# Patient Record
Sex: FEMALE | Race: WHITE | NOT HISPANIC OR LATINO | URBAN - METROPOLITAN AREA
[De-identification: names, ages, dates, MRNs, and addresses within clinical notes are randomized per-mention and may not be internally consistent; named-entity substitution may affect disease eponyms.]

---

## 2018-05-04 ENCOUNTER — RECORDS - HEALTHEAST (OUTPATIENT)
Dept: LAB | Facility: CLINIC | Age: 73
End: 2018-05-04

## 2018-05-04 LAB
25(OH)D3 SERPL-MCNC: 41.4 NG/ML (ref 30–80)
ALBUMIN SERPL-MCNC: 2.9 G/DL (ref 3.5–5)
ALP SERPL-CCNC: 61 U/L (ref 45–120)
ALT SERPL W P-5'-P-CCNC: <9 U/L (ref 0–45)
ANION GAP SERPL CALCULATED.3IONS-SCNC: 8 MMOL/L (ref 5–18)
AST SERPL W P-5'-P-CCNC: 11 U/L (ref 0–40)
BILIRUB SERPL-MCNC: 0.8 MG/DL (ref 0–1)
BUN SERPL-MCNC: 17 MG/DL (ref 8–28)
CALCIUM SERPL-MCNC: 9.2 MG/DL (ref 8.5–10.5)
CHLORIDE BLD-SCNC: 101 MMOL/L (ref 98–107)
CO2 SERPL-SCNC: 31 MMOL/L (ref 22–31)
CREAT SERPL-MCNC: 0.76 MG/DL (ref 0.6–1.1)
ERYTHROCYTE [DISTWIDTH] IN BLOOD BY AUTOMATED COUNT: 13.2 % (ref 11–14.5)
GFR SERPL CREATININE-BSD FRML MDRD: >60 ML/MIN/1.73M2
GLUCOSE BLD-MCNC: 86 MG/DL (ref 70–125)
HBA1C MFR BLD: 5.9 % (ref 4.2–6.1)
HCT VFR BLD AUTO: 37.8 % (ref 35–47)
HGB BLD-MCNC: 11.7 G/DL (ref 12–16)
MCH RBC QN AUTO: 28.8 PG (ref 27–34)
MCHC RBC AUTO-ENTMCNC: 31 G/DL (ref 32–36)
MCV RBC AUTO: 93 FL (ref 80–100)
PLATELET # BLD AUTO: 227 THOU/UL (ref 140–440)
PMV BLD AUTO: 9.4 FL (ref 8.5–12.5)
POTASSIUM BLD-SCNC: 3.7 MMOL/L (ref 3.5–5)
PROT SERPL-MCNC: 6.2 G/DL (ref 6–8)
RBC # BLD AUTO: 4.06 MILL/UL (ref 3.8–5.4)
SODIUM SERPL-SCNC: 140 MMOL/L (ref 136–145)
TSH SERPL DL<=0.005 MIU/L-ACNC: 2.07 UIU/ML (ref 0.3–5)
VIT B12 SERPL-MCNC: 362 PG/ML (ref 213–816)
WBC: 7.6 THOU/UL (ref 4–11)

## 2019-02-01 ENCOUNTER — RECORDS - HEALTHEAST (OUTPATIENT)
Dept: LAB | Facility: CLINIC | Age: 74
End: 2019-02-01

## 2019-02-01 LAB
ALBUMIN SERPL-MCNC: 3.7 G/DL (ref 3.5–5)
ANION GAP SERPL CALCULATED.3IONS-SCNC: 10 MMOL/L (ref 5–18)
BUN SERPL-MCNC: 16 MG/DL (ref 8–28)
CALCIUM SERPL-MCNC: 10.6 MG/DL (ref 8.5–10.5)
CHLORIDE BLD-SCNC: 98 MMOL/L (ref 98–107)
CO2 SERPL-SCNC: 33 MMOL/L (ref 22–31)
CREAT SERPL-MCNC: 0.91 MG/DL (ref 0.6–1.1)
GFR SERPL CREATININE-BSD FRML MDRD: >60 ML/MIN/1.73M2
GLUCOSE BLD-MCNC: 94 MG/DL (ref 70–125)
PHOSPHATE SERPL-MCNC: 3.9 MG/DL (ref 2.5–4.5)
POTASSIUM BLD-SCNC: 3.8 MMOL/L (ref 3.5–5)
SODIUM SERPL-SCNC: 141 MMOL/L (ref 136–145)

## 2019-02-03 LAB — HBA1C MFR BLD: 5.9 % (ref 4.2–6.1)

## 2019-04-04 ENCOUNTER — RECORDS - HEALTHEAST (OUTPATIENT)
Dept: LAB | Facility: CLINIC | Age: 74
End: 2019-04-04

## 2019-04-04 LAB
ANION GAP SERPL CALCULATED.3IONS-SCNC: 10 MMOL/L (ref 5–18)
BUN SERPL-MCNC: 15 MG/DL (ref 8–28)
CALCIUM SERPL-MCNC: 10.4 MG/DL (ref 8.5–10.5)
CHLORIDE BLD-SCNC: 99 MMOL/L (ref 98–107)
CO2 SERPL-SCNC: 31 MMOL/L (ref 22–31)
CREAT SERPL-MCNC: 0.87 MG/DL (ref 0.6–1.1)
ERYTHROCYTE [DISTWIDTH] IN BLOOD BY AUTOMATED COUNT: 13.1 % (ref 11–14.5)
GFR SERPL CREATININE-BSD FRML MDRD: >60 ML/MIN/1.73M2
GLUCOSE BLD-MCNC: 78 MG/DL (ref 70–125)
HCT VFR BLD AUTO: 41.4 % (ref 35–47)
HGB BLD-MCNC: 13 G/DL (ref 12–16)
MCH RBC QN AUTO: 29.8 PG (ref 27–34)
MCHC RBC AUTO-ENTMCNC: 31.4 G/DL (ref 32–36)
MCV RBC AUTO: 95 FL (ref 80–100)
PLATELET # BLD AUTO: 230 THOU/UL (ref 140–440)
PMV BLD AUTO: 9.8 FL (ref 8.5–12.5)
POTASSIUM BLD-SCNC: 3.5 MMOL/L (ref 3.5–5)
RBC # BLD AUTO: 4.36 MILL/UL (ref 3.8–5.4)
SODIUM SERPL-SCNC: 140 MMOL/L (ref 136–145)
TSH SERPL DL<=0.005 MIU/L-ACNC: 1.55 UIU/ML (ref 0.3–5)
WBC: 6.9 THOU/UL (ref 4–11)

## 2020-01-17 ENCOUNTER — RECORDS - HEALTHEAST (OUTPATIENT)
Dept: LAB | Facility: CLINIC | Age: 75
End: 2020-01-17

## 2020-01-17 LAB
25(OH)D3 SERPL-MCNC: 32.1 NG/ML (ref 30–80)
ANION GAP SERPL CALCULATED.3IONS-SCNC: 8 MMOL/L (ref 5–18)
BUN SERPL-MCNC: 17 MG/DL (ref 8–28)
CALCIUM SERPL-MCNC: 9.5 MG/DL (ref 8.5–10.5)
CHLORIDE BLD-SCNC: 100 MMOL/L (ref 98–107)
CHOLEST SERPL-MCNC: 148 MG/DL
CO2 SERPL-SCNC: 33 MMOL/L (ref 22–31)
CREAT SERPL-MCNC: 0.97 MG/DL (ref 0.6–1.1)
ERYTHROCYTE [DISTWIDTH] IN BLOOD BY AUTOMATED COUNT: 13.2 % (ref 11–14.5)
FASTING STATUS PATIENT QL REPORTED: ABNORMAL
GFR SERPL CREATININE-BSD FRML MDRD: 56 ML/MIN/1.73M2
GLUCOSE BLD-MCNC: 91 MG/DL (ref 70–125)
HCT VFR BLD AUTO: 38.9 % (ref 35–47)
HDLC SERPL-MCNC: 45 MG/DL
HGB BLD-MCNC: 12.2 G/DL (ref 12–16)
LDLC SERPL CALC-MCNC: 80 MG/DL
MCH RBC QN AUTO: 28.9 PG (ref 27–34)
MCHC RBC AUTO-ENTMCNC: 31.4 G/DL (ref 32–36)
MCV RBC AUTO: 92 FL (ref 80–100)
PLATELET # BLD AUTO: 245 THOU/UL (ref 140–440)
PMV BLD AUTO: 9.8 FL (ref 8.5–12.5)
POTASSIUM BLD-SCNC: 3.8 MMOL/L (ref 3.5–5)
RBC # BLD AUTO: 4.22 MILL/UL (ref 3.8–5.4)
SODIUM SERPL-SCNC: 141 MMOL/L (ref 136–145)
TRIGL SERPL-MCNC: 116 MG/DL
TSH SERPL DL<=0.005 MIU/L-ACNC: 1.6 UIU/ML (ref 0.3–5)
WBC: 7.5 THOU/UL (ref 4–11)

## 2020-02-19 ENCOUNTER — RECORDS - HEALTHEAST (OUTPATIENT)
Dept: LAB | Facility: CLINIC | Age: 75
End: 2020-02-19

## 2020-02-19 LAB
ALBUMIN SERPL-MCNC: 3.4 G/DL (ref 3.5–5)
ANION GAP SERPL CALCULATED.3IONS-SCNC: 7 MMOL/L (ref 5–18)
BUN SERPL-MCNC: 23 MG/DL (ref 8–28)
CALCIUM SERPL-MCNC: 9.7 MG/DL (ref 8.5–10.5)
CHLORIDE BLD-SCNC: 102 MMOL/L (ref 98–107)
CO2 SERPL-SCNC: 34 MMOL/L (ref 22–31)
CREAT SERPL-MCNC: 0.92 MG/DL (ref 0.6–1.1)
GFR SERPL CREATININE-BSD FRML MDRD: 60 ML/MIN/1.73M2
GLUCOSE BLD-MCNC: 94 MG/DL (ref 70–125)
PHOSPHATE SERPL-MCNC: 3.9 MG/DL (ref 2.5–4.5)
POTASSIUM BLD-SCNC: 3.5 MMOL/L (ref 3.5–5)
SODIUM SERPL-SCNC: 143 MMOL/L (ref 136–145)

## 2020-09-27 ENCOUNTER — RECORDS - HEALTHEAST (OUTPATIENT)
Dept: LAB | Facility: CLINIC | Age: 75
End: 2020-09-27

## 2020-09-28 ENCOUNTER — RECORDS - HEALTHEAST (OUTPATIENT)
Dept: LAB | Facility: CLINIC | Age: 75
End: 2020-09-28

## 2020-09-28 LAB
ALBUMIN SERPL-MCNC: 3.4 G/DL (ref 3.5–5)
ANION GAP SERPL CALCULATED.3IONS-SCNC: 8 MMOL/L (ref 5–18)
BUN SERPL-MCNC: 21 MG/DL (ref 8–28)
CALCIUM SERPL-MCNC: 9.5 MG/DL (ref 8.5–10.5)
CHLORIDE BLD-SCNC: 101 MMOL/L (ref 98–107)
CO2 SERPL-SCNC: 31 MMOL/L (ref 22–31)
CREAT SERPL-MCNC: 0.87 MG/DL (ref 0.6–1.1)
GFR SERPL CREATININE-BSD FRML MDRD: >60 ML/MIN/1.73M2
GLUCOSE BLD-MCNC: 82 MG/DL (ref 70–125)
PHOSPHATE SERPL-MCNC: 3.4 MG/DL (ref 2.5–4.5)
POTASSIUM BLD-SCNC: 3.8 MMOL/L (ref 3.5–5)
SODIUM SERPL-SCNC: 140 MMOL/L (ref 136–145)

## 2020-09-29 LAB
ALBUMIN SERPL-MCNC: 3.4 G/DL (ref 3.5–5)
ANION GAP SERPL CALCULATED.3IONS-SCNC: 9 MMOL/L (ref 5–18)
BUN SERPL-MCNC: 19 MG/DL (ref 8–28)
CALCIUM SERPL-MCNC: 9.7 MG/DL (ref 8.5–10.5)
CHLORIDE BLD-SCNC: 100 MMOL/L (ref 98–107)
CO2 SERPL-SCNC: 31 MMOL/L (ref 22–31)
CREAT SERPL-MCNC: 0.87 MG/DL (ref 0.6–1.1)
GFR SERPL CREATININE-BSD FRML MDRD: >60 ML/MIN/1.73M2
GLUCOSE BLD-MCNC: 73 MG/DL (ref 70–125)
PHOSPHATE SERPL-MCNC: 3.8 MG/DL (ref 2.5–4.5)
POTASSIUM BLD-SCNC: 3.9 MMOL/L (ref 3.5–5)
SODIUM SERPL-SCNC: 140 MMOL/L (ref 136–145)

## 2020-11-18 ENCOUNTER — AMBULATORY - HEALTHEAST (OUTPATIENT)
Dept: GERIATRICS | Facility: CLINIC | Age: 75
End: 2020-11-18

## 2020-11-19 ENCOUNTER — OFFICE VISIT - HEALTHEAST (OUTPATIENT)
Dept: GERIATRICS | Facility: CLINIC | Age: 75
End: 2020-11-19

## 2020-11-19 DIAGNOSIS — E03.9 HYPOTHYROIDISM, UNSPECIFIED TYPE: ICD-10-CM

## 2020-11-19 DIAGNOSIS — U07.1 INFECTION DUE TO 2019 NOVEL CORONAVIRUS: ICD-10-CM

## 2020-11-19 DIAGNOSIS — I10 ESSENTIAL HYPERTENSION: ICD-10-CM

## 2020-11-20 ENCOUNTER — COMMUNICATION - HEALTHEAST (OUTPATIENT)
Dept: GERIATRICS | Facility: CLINIC | Age: 75
End: 2020-11-20

## 2020-11-20 ENCOUNTER — OFFICE VISIT - HEALTHEAST (OUTPATIENT)
Dept: GERIATRICS | Facility: CLINIC | Age: 75
End: 2020-11-20

## 2020-11-20 ENCOUNTER — AMBULATORY - HEALTHEAST (OUTPATIENT)
Dept: ADMINISTRATIVE | Facility: CLINIC | Age: 75
End: 2020-11-20

## 2020-11-20 DIAGNOSIS — U07.1 INFECTION DUE TO 2019 NOVEL CORONAVIRUS: ICD-10-CM

## 2020-11-20 DIAGNOSIS — Z91.89 AT RISK FOR MALNUTRITION: ICD-10-CM

## 2020-11-20 DIAGNOSIS — Z71.89 ACP (ADVANCE CARE PLANNING): ICD-10-CM

## 2020-11-20 DIAGNOSIS — R53.81 PHYSICAL DECONDITIONING: ICD-10-CM

## 2020-11-20 DIAGNOSIS — U07.1 COVID-19: ICD-10-CM

## 2020-11-20 DIAGNOSIS — M12.9 ARTHROPATHY: ICD-10-CM

## 2020-11-20 RX ORDER — LOSARTAN POTASSIUM 100 MG/1
100 TABLET ORAL DAILY
Status: SHIPPED | COMMUNITY
Start: 2020-11-20

## 2020-11-20 RX ORDER — CITALOPRAM HYDROBROMIDE 20 MG/1
20 TABLET ORAL DAILY
Status: SHIPPED | COMMUNITY
Start: 2020-11-20

## 2020-11-20 RX ORDER — BENZOCAINE/MENTHOL 6 MG-10 MG
1 LOZENGE MUCOUS MEMBRANE 2 TIMES DAILY PRN
Status: SHIPPED | COMMUNITY
Start: 2020-11-20

## 2020-11-20 RX ORDER — ATORVASTATIN CALCIUM 20 MG/1
20 TABLET, FILM COATED ORAL DAILY
Status: SHIPPED | COMMUNITY
Start: 2020-11-20

## 2020-11-20 RX ORDER — ACETAMINOPHEN 500 MG
1000 TABLET ORAL 2 TIMES DAILY
Status: SHIPPED | COMMUNITY
Start: 2020-11-20

## 2020-11-20 RX ORDER — DEXTROMETHORPHAN HBR. AND GUAIFENESIN 10; 100 MG/5ML; MG/5ML
15 SOLUTION ORAL EVERY 8 HOURS PRN
Status: SHIPPED | COMMUNITY
Start: 2020-11-20

## 2020-11-20 RX ORDER — OXYBUTYNIN CHLORIDE 10 MG/1
10 TABLET, EXTENDED RELEASE ORAL DAILY
Status: SHIPPED | COMMUNITY
Start: 2020-11-20

## 2020-11-20 RX ORDER — MENTHOL 40 MG/ML
1 GEL TOPICAL EVERY 8 HOURS PRN
Status: SHIPPED | COMMUNITY
Start: 2020-11-20

## 2020-11-20 RX ORDER — SILICONE ADHESIVE 1.5" X 3"
1 SHEET (EA) TOPICAL AT BEDTIME
Status: SHIPPED | COMMUNITY
Start: 2020-11-20

## 2020-11-20 RX ORDER — INDAPAMIDE 2.5 MG/1
2.5 TABLET ORAL DAILY
Status: SHIPPED | COMMUNITY
Start: 2020-11-20

## 2020-11-20 RX ORDER — LEVOTHYROXINE SODIUM 75 UG/1
75 TABLET ORAL DAILY
Status: SHIPPED | COMMUNITY
Start: 2020-11-20

## 2020-11-21 RX ORDER — TRAMADOL HYDROCHLORIDE 50 MG/1
50 TABLET ORAL 2 TIMES DAILY
Qty: 90 TABLET | Refills: 0 | Status: SHIPPED | OUTPATIENT
Start: 2020-11-21

## 2020-11-23 ENCOUNTER — OFFICE VISIT - HEALTHEAST (OUTPATIENT)
Dept: GERIATRICS | Facility: CLINIC | Age: 75
End: 2020-11-23

## 2020-11-23 DIAGNOSIS — I10 ESSENTIAL HYPERTENSION: ICD-10-CM

## 2020-11-23 DIAGNOSIS — U07.1 INFECTION DUE TO 2019 NOVEL CORONAVIRUS: ICD-10-CM

## 2020-11-23 DIAGNOSIS — E03.9 HYPOTHYROIDISM, UNSPECIFIED TYPE: ICD-10-CM

## 2020-11-25 ENCOUNTER — OFFICE VISIT - HEALTHEAST (OUTPATIENT)
Dept: GERIATRICS | Facility: CLINIC | Age: 75
End: 2020-11-25

## 2020-11-25 DIAGNOSIS — M12.9 ARTHROPATHY: ICD-10-CM

## 2020-11-25 DIAGNOSIS — Z91.89 AT RISK FOR MALNUTRITION: ICD-10-CM

## 2020-11-25 DIAGNOSIS — R53.81 PHYSICAL DECONDITIONING: ICD-10-CM

## 2020-11-25 DIAGNOSIS — U07.1 COVID-19: ICD-10-CM

## 2020-11-25 ASSESSMENT — MIFFLIN-ST. JEOR: SCORE: 1635.77

## 2020-11-27 ENCOUNTER — OFFICE VISIT - HEALTHEAST (OUTPATIENT)
Dept: GERIATRICS | Facility: CLINIC | Age: 75
End: 2020-11-27

## 2020-11-27 DIAGNOSIS — U07.1 COVID-19: ICD-10-CM

## 2020-11-27 DIAGNOSIS — M12.9 ARTHROPATHY: ICD-10-CM

## 2020-11-27 DIAGNOSIS — R53.81 PHYSICAL DECONDITIONING: ICD-10-CM

## 2020-11-27 DIAGNOSIS — Z91.89 AT RISK FOR MALNUTRITION: ICD-10-CM

## 2020-11-30 ENCOUNTER — COMMUNICATION - HEALTHEAST (OUTPATIENT)
Dept: GERIATRICS | Facility: CLINIC | Age: 75
End: 2020-11-30

## 2020-11-30 ENCOUNTER — OFFICE VISIT - HEALTHEAST (OUTPATIENT)
Dept: GERIATRICS | Facility: CLINIC | Age: 75
End: 2020-11-30

## 2020-11-30 DIAGNOSIS — I10 ESSENTIAL HYPERTENSION: ICD-10-CM

## 2020-11-30 DIAGNOSIS — U07.1 INFECTION DUE TO 2019 NOVEL CORONAVIRUS: ICD-10-CM

## 2020-11-30 DIAGNOSIS — R53.81 PHYSICAL DECONDITIONING: ICD-10-CM

## 2020-12-01 ENCOUNTER — COMMUNICATION - HEALTHEAST (OUTPATIENT)
Dept: GERIATRICS | Facility: CLINIC | Age: 75
End: 2020-12-01

## 2020-12-01 ENCOUNTER — RECORDS - HEALTHEAST (OUTPATIENT)
Dept: LAB | Facility: CLINIC | Age: 75
End: 2020-12-01

## 2020-12-02 ENCOUNTER — OFFICE VISIT - HEALTHEAST (OUTPATIENT)
Dept: GERIATRICS | Facility: CLINIC | Age: 75
End: 2020-12-02

## 2020-12-02 DIAGNOSIS — R53.81 PHYSICAL DECONDITIONING: ICD-10-CM

## 2020-12-02 DIAGNOSIS — Z91.89 AT RISK FOR MALNUTRITION: ICD-10-CM

## 2020-12-02 DIAGNOSIS — U07.1 COVID-19: ICD-10-CM

## 2020-12-02 DIAGNOSIS — M12.9 ARTHROPATHY: ICD-10-CM

## 2020-12-02 LAB
BNP SERPL-MCNC: 47 PG/ML (ref 0–133)
ERYTHROCYTE [DISTWIDTH] IN BLOOD BY AUTOMATED COUNT: 13.2 % (ref 11–14.5)
HCT VFR BLD AUTO: 41 % (ref 35–47)
HGB BLD-MCNC: 12.9 G/DL (ref 12–16)
MCH RBC QN AUTO: 29.9 PG (ref 27–34)
MCHC RBC AUTO-ENTMCNC: 31.5 G/DL (ref 32–36)
MCV RBC AUTO: 95 FL (ref 80–100)
PLATELET # BLD AUTO: 341 THOU/UL (ref 140–440)
PMV BLD AUTO: 9.1 FL (ref 8.5–12.5)
RBC # BLD AUTO: 4.32 MILL/UL (ref 3.8–5.4)
WBC: 6.5 THOU/UL (ref 4–11)

## 2020-12-02 ASSESSMENT — MIFFLIN-ST. JEOR: SCORE: 1562.74

## 2020-12-03 ENCOUNTER — OFFICE VISIT - HEALTHEAST (OUTPATIENT)
Dept: GERIATRICS | Facility: CLINIC | Age: 75
End: 2020-12-03

## 2020-12-03 DIAGNOSIS — I10 ESSENTIAL HYPERTENSION: ICD-10-CM

## 2020-12-03 DIAGNOSIS — R53.81 PHYSICAL DECONDITIONING: ICD-10-CM

## 2020-12-03 DIAGNOSIS — U07.1 INFECTION DUE TO 2019 NOVEL CORONAVIRUS: ICD-10-CM

## 2020-12-04 ENCOUNTER — OFFICE VISIT - HEALTHEAST (OUTPATIENT)
Dept: GERIATRICS | Facility: CLINIC | Age: 75
End: 2020-12-04

## 2020-12-04 DIAGNOSIS — R53.81 PHYSICAL DECONDITIONING: ICD-10-CM

## 2020-12-04 DIAGNOSIS — Z91.89 AT RISK FOR MALNUTRITION: ICD-10-CM

## 2020-12-04 DIAGNOSIS — U07.1 COVID-19: ICD-10-CM

## 2020-12-04 DIAGNOSIS — M12.9 ARTHROPATHY: ICD-10-CM

## 2020-12-04 ASSESSMENT — MIFFLIN-ST. JEOR: SCORE: 1569.1

## 2020-12-07 ENCOUNTER — OFFICE VISIT - HEALTHEAST (OUTPATIENT)
Dept: GERIATRICS | Facility: CLINIC | Age: 75
End: 2020-12-07

## 2020-12-07 DIAGNOSIS — R53.81 PHYSICAL DECONDITIONING: ICD-10-CM

## 2020-12-07 DIAGNOSIS — I10 ESSENTIAL HYPERTENSION: ICD-10-CM

## 2020-12-07 DIAGNOSIS — U07.1 INFECTION DUE TO 2019 NOVEL CORONAVIRUS: ICD-10-CM

## 2020-12-09 ENCOUNTER — OFFICE VISIT - HEALTHEAST (OUTPATIENT)
Dept: GERIATRICS | Facility: CLINIC | Age: 75
End: 2020-12-09

## 2020-12-09 DIAGNOSIS — M12.9 ARTHROPATHY: ICD-10-CM

## 2020-12-09 DIAGNOSIS — U07.1 COVID-19: ICD-10-CM

## 2020-12-09 DIAGNOSIS — Z91.89 AT RISK FOR MALNUTRITION: ICD-10-CM

## 2020-12-09 DIAGNOSIS — R53.81 PHYSICAL DECONDITIONING: ICD-10-CM

## 2020-12-09 ASSESSMENT — MIFFLIN-ST. JEOR: SCORE: 1569.1

## 2020-12-16 ENCOUNTER — AMBULATORY - HEALTHEAST (OUTPATIENT)
Dept: GERIATRICS | Facility: CLINIC | Age: 75
End: 2020-12-16

## 2021-03-04 ENCOUNTER — RECORDS - HEALTHEAST (OUTPATIENT)
Dept: LAB | Facility: CLINIC | Age: 76
End: 2021-03-04

## 2021-03-05 LAB
25(OH)D3 SERPL-MCNC: 29.9 NG/ML (ref 30–80)
ALBUMIN SERPL-MCNC: 3.3 G/DL (ref 3.5–5)
ANION GAP SERPL CALCULATED.3IONS-SCNC: 10 MMOL/L (ref 5–18)
BUN SERPL-MCNC: 21 MG/DL (ref 8–28)
CALCIUM SERPL-MCNC: 9.2 MG/DL (ref 8.5–10.5)
CHLORIDE BLD-SCNC: 101 MMOL/L (ref 98–107)
CO2 SERPL-SCNC: 32 MMOL/L (ref 22–31)
CREAT SERPL-MCNC: 0.88 MG/DL (ref 0.6–1.1)
ERYTHROCYTE [DISTWIDTH] IN BLOOD BY AUTOMATED COUNT: 13.5 % (ref 11–14.5)
GFR SERPL CREATININE-BSD FRML MDRD: >60 ML/MIN/1.73M2
GLUCOSE BLD-MCNC: 78 MG/DL (ref 70–125)
HBA1C MFR BLD: 5.6 %
HCT VFR BLD AUTO: 39.9 % (ref 35–47)
HGB BLD-MCNC: 12.6 G/DL (ref 12–16)
MCH RBC QN AUTO: 30.4 PG (ref 27–34)
MCHC RBC AUTO-ENTMCNC: 31.6 G/DL (ref 32–36)
MCV RBC AUTO: 96 FL (ref 80–100)
PHOSPHATE SERPL-MCNC: 3.5 MG/DL (ref 2.5–4.5)
PLATELET # BLD AUTO: 228 THOU/UL (ref 140–440)
PMV BLD AUTO: 9.6 FL (ref 8.5–12.5)
POTASSIUM BLD-SCNC: 3.5 MMOL/L (ref 3.5–5)
RBC # BLD AUTO: 4.15 MILL/UL (ref 3.8–5.4)
SODIUM SERPL-SCNC: 143 MMOL/L (ref 136–145)
TSH SERPL DL<=0.005 MIU/L-ACNC: 1.36 UIU/ML (ref 0.3–5)
WBC: 6.9 THOU/UL (ref 4–11)

## 2021-06-05 VITALS
HEART RATE: 78 BPM | SYSTOLIC BLOOD PRESSURE: 152 MMHG | TEMPERATURE: 97.6 F | DIASTOLIC BLOOD PRESSURE: 88 MMHG | WEIGHT: 232 LBS | OXYGEN SATURATION: 94 % | RESPIRATION RATE: 16 BRPM | BODY MASS INDEX: 37.45 KG/M2

## 2021-06-05 VITALS
HEIGHT: 66 IN | HEART RATE: 65 BPM | OXYGEN SATURATION: 97 % | TEMPERATURE: 97 F | BODY MASS INDEX: 37.28 KG/M2 | WEIGHT: 232 LBS | SYSTOLIC BLOOD PRESSURE: 124 MMHG | RESPIRATION RATE: 16 BRPM | DIASTOLIC BLOOD PRESSURE: 65 MMHG

## 2021-06-05 VITALS
WEIGHT: 230.6 LBS | BODY MASS INDEX: 37.06 KG/M2 | DIASTOLIC BLOOD PRESSURE: 61 MMHG | RESPIRATION RATE: 16 BRPM | TEMPERATURE: 97.8 F | HEART RATE: 73 BPM | OXYGEN SATURATION: 90 % | SYSTOLIC BLOOD PRESSURE: 144 MMHG | HEIGHT: 66 IN

## 2021-06-05 VITALS
DIASTOLIC BLOOD PRESSURE: 71 MMHG | RESPIRATION RATE: 18 BRPM | BODY MASS INDEX: 39.65 KG/M2 | SYSTOLIC BLOOD PRESSURE: 124 MMHG | OXYGEN SATURATION: 90 % | TEMPERATURE: 98 F | WEIGHT: 246.7 LBS | HEIGHT: 66 IN | HEART RATE: 86 BPM

## 2021-06-05 VITALS
SYSTOLIC BLOOD PRESSURE: 164 MMHG | HEIGHT: 66 IN | DIASTOLIC BLOOD PRESSURE: 70 MMHG | TEMPERATURE: 97.3 F | OXYGEN SATURATION: 90 % | RESPIRATION RATE: 18 BRPM | BODY MASS INDEX: 37.28 KG/M2 | WEIGHT: 232 LBS | HEART RATE: 65 BPM

## 2021-06-05 VITALS
WEIGHT: 246.7 LBS | OXYGEN SATURATION: 96 % | RESPIRATION RATE: 18 BRPM | HEART RATE: 89 BPM | DIASTOLIC BLOOD PRESSURE: 44 MMHG | TEMPERATURE: 97.9 F | SYSTOLIC BLOOD PRESSURE: 105 MMHG

## 2021-06-13 NOTE — PROGRESS NOTES
[facility]    Facility:   [place] [facility] [[id number]]   Code Status: POLST AVAILABLE    Reassessment of 75-year-old female who continues in quarantine, COVID-19 positive diagnosis, history of intellectual delay, depression, hypertension, requiring supplemental O2.  Order for chest x-ray 48 hours ago.  Recent hypoxia,nursing report patient continues to require supplemental O2, remains afebrile.    Review of systems: Denies fever sweats or chills.  No sense of dyspnea.  Appetite adequate.  No nausea or vomiting.  Generalized weakness present.  Remainder of 12 point review of systems obtained negative, questionably reliable in view of intellectual delay.    Exam: Patient sitting in chair, supplemental O2 in place, no evidence of respiratory distress.  O2 90% on 1 L.  Blood pressure 112/6 2, heart rate 73.  Crowded oropharynx, no pharyngeal erythema.  S1 and S2 regular.  Pulmonary exam with shallow inspiratory effort, minimally audible dry crackles extreme lower lung bases.    Impression and plan: Recent COVID-19, continued hypoxia, afebrile, no evidence of acute respiratory process, BNP normal, no clinical evidence of cardiac decompensation, obesity hypoventilation, continue to encourage deep breathing, continue cautious observation.    Hypertension on Cozaar and Lozol with satisfactory control of blood pressure.    Chronic depression, mood at baseline.    Significant deconditioning with ongoing need for rehabilitation.      Electronically signed by: Michell Senior MD

## 2021-06-13 NOTE — PROGRESS NOTES
[facility]    Facility:   [facility] [[id number]]   Code Status: FULL CODE and POLST AVAILABLE      CHIEF COMPLAINT/REASON FOR VISIT:  Chief Complaint   Patient presents with     Review Of Multiple Medical Conditions     TCU intake--arthropathy, COVID-19, physical deconditioning, ACP       HISTORY:      HPI: Rozina is a 75 y.o. female who  has a past medical history of Age related osteoporosis, Anxiety, Arthropathy, COVID-19, DM2 (diabetes mellitus, type 2) (H), HTN (hypertension), Hypothyroidism, Major depressive disorder, and Mild intellectual disabilities. Rozina was recently admitted to [facility] for COVID-19 treatment after coming from an assisted living facility who could no longer care for her due to COVID-19. Rozina was unable to give much information and due to the current COVID-19 pandemic history has been difficult to come by. Unable to find other information, call to patient's guardian-- her sister Carolina Bush-- however, no return call.     Today Rozina is being evaluated for an intake into the TCU. Rozina shares she has been doing well. She has no acute issues to report. Rozina shares she has been doing well. She has been participating in therapies. She does not like them. States she is fine without them. She has not had any respiratory issues or concerns. Rozina was screened by dietitian who feels she is at risk for malnutrition due to COVID-19 and variable intake with potential weight loss for patient. Rozina will pay attention to what she is eating and attempt to eat as much as possible. Rozina denies any other concerns including fevers/chills, cough or cold symptoms, headaches, vision changes, chest pain/pressure, difficulty breathing, SOB, abdominal pain, nausea, vomiting, diarrhea, dysuria, increasing weakness, increasing pain.     Advanced Care Planning  Spoke with Rozina regarding code status and advanced care planning. Rozina consented to discussion and is aware of possible  jerica. They are also aware of the necessity of this discussion due to TCU admission. Discussed that she would like to have full resuscitation efforts. she would also like to have treatment if she  were to fall ill. she would like full medical treatment for all medical issues. Her sister Carolina Bush, who is also her POA would decide for her/him if she  was unable to make medical decisions. she agrees to IV/IM antibiotics. she  is ok with a feeding tube. There are no [employer] obligations she  would like documented at this time. She is ok with organ donation.     Past Medical History:   Diagnosis Date     Age related osteoporosis      Anxiety      Arthropathy      COVID-19      DM2 (diabetes mellitus, type 2) (H)      HTN (hypertension)      Hypothyroidism      Major depressive disorder      Mild intellectual disabilities              No family history on file.  Social History     Socioeconomic History     Marital status: Single     Spouse name: Not on file     Number of children: Not on file     Years of education: Not on file     Highest education level: Not on file   Occupational History     Not on file   Social Needs     Financial resource strain: Not on file     Food insecurity     Worry: Not on file     Inability: Not on file     Transportation needs     Medical: Not on file     Non-medical: Not on file   Tobacco Use     Smoking status: Not on file   Substance and Sexual Activity     Alcohol use: Not on file     Drug use: Not on file     Sexual activity: Not on file   Lifestyle     Physical activity     Days per week: Not on file     Minutes per session: Not on file     Stress: Not on file   Relationships     Social connections     Talks on phone: Not on file     Gets together: Not on file     Attends [employer] service: Not on file     Active member of club or organization: Not on file     Attends meetings of clubs or organizations: Not on file     Relationship status: Not on file     Intimate partner violence      Fear of current or ex partner: Not on file     Emotionally abused: Not on file     Physically abused: Not on file     Forced sexual activity: Not on file   Other Topics Concern     Not on file   Social History Narrative     Not on file       REVIEW OF SYSTEM:  Per HPI    PHYSICAL EXAM:   /44   Pulse 89   Temp 97.9  F (36.6  C)   Resp 18   Wt (!) 246 lb 11.2 oz (111.9 kg)   SpO2 96%     A limited exam was performed due to recommendations for care during COVID-19 pandemic. Due to the 2020 COVID-19 pandemic, except as noted above, the patient was visually observed at a 6 foot plus distance.  An observational exam was performed in an effort to keep patient safe from COVID-19 and other communicable diseases.     General appearance: alert, appears stated age and cooperative  HEENT: Head is normocephalic with normal hair distribution. No evidence of trauma. Ears: Without lesions or deformity. No acute purulent discharge. Eyes: Conjunctivae pink with no scleral icterus or erythema. Nose: Normal. Oropharnyx: mmm.  Lungs: respirations without effort.  Extremities: extremities normal, atraumatic, no cyanosis.  Skin: Skin color, texture normal. No rashes or lesions on exposed skin.   Neurologic: Grossly normal   Psych: interacts well with caregivers, exhibits logical thought processes and connections, pleasant.      LABS:   None today.     ASSESSMENT:      ICD-10-CM    1. Arthropathy  M12.9    2. At risk for malnutrition  Z91.89    3. Infection due to 2019 novel coronavirus  U07.1 traMADoL (ULTRAM) 50 mg tablet   4. Physical deconditioning  R53.81    5. COVID-19  U07.1    6. ACP (advance care planning)  Z71.89        PLAN:    Arthropathy, Physical Deconditioning  -Continue PT/OT and other therapies as per care plan.  -Encouraged good nutrition and movement habits.   -Discussed care plan and expected course of stay.   -Continue to follow-up per routine schedule or sooner if needed.     COVID-19  Infection  -COVID-19 PCR positive.   -Albuterol 2 puffs with chamber every 4 hours as needed for shortness of breath or wheeze.   -Tylenol 650 mg by mouth four times a day for fever, pain as needed.   -Vitamin D3 5000 iu by mouth daily.   -Quarantine.   -Follow up routinely per TCU standards.     At risk for malnutrition  -Dietitian to eval and treat.  -Supplementation at needed.     Admission history and physical per MD in the next 30 days. At this time continue current care plan for all chronic medical conditions, as they are stable. Encouraged patient to engage in PT/OT for strengthening and conditioning. Encouraged patient to work closely with nursing staff to ensure any medical complaints are quickly addressed. Follow up this week or sooner if needed. Will continue to monitor patient and work with nursing staff collaboratively to work toward positive patient outcomes.    Total unit/floor time of 50 minutes time consisted of the following: time spent with patient, examination of patient, reviewing the record including pertinent labs and completing documentation. More than 50% of this time was spent in coordination of care time with nursing staff and other healthcare providers, this time was spent on discussion/counseling on current care plan including medical management of chronic health problems and acute health problems, education pertaining to plan, and discussion of the goals of care pertaining to the current outlined plan with nursing staff and patient. An additional 16 minutes of time was spent discussing code status, wishes for end of life care and reviewing POLST from 1440 to 1456. POLST signed and left with nursing staff.     Electronically signed by: Julianna Craven CNP

## 2021-06-13 NOTE — PROGRESS NOTES
[facility]    Facility:   [facility] [[id number]]   Code Status: FULL CODE and POLST AVAILABLE      CHIEF COMPLAINT/REASON FOR VISIT:  Chief Complaint   Patient presents with     Problem Visit     COVID-19       HISTORY:      HPI: Rozina is a 75 y.o. female who  has a past medical history of Age related osteoporosis, Anxiety, Arthropathy, COVID-19, DM2 (diabetes mellitus, type 2) (H), HTN (hypertension), Hypothyroidism, Major depressive disorder, and Mild intellectual disabilities. Rozina was recently admitted to [facility] for COVID-19 treatment after coming from an assisted living facility who could no longer care for her due to COVID-19. Rozina was unable to give much information and due to the current COVID-19 pandemic history has been difficult to come by.    Today Rozina is being evaluated for a routine review of multiple medical problems while in the TCU.  She states she is doing good.  She has no new concerns or issues of her.  Established and stable.  Therapies report that things are going well.  She does not necessarily enjoy therapies but states she enjoys the therapists.  Rozina states she has no pains or aches.  She reports she has been eating, drinking and eliminating well.  We did discuss discharge planning and she is wanting to go back to her previous facility.  Rozina denies any other concerns including fevers/chills, cough or cold symptoms, headaches, vision changes, chest pain/pressure, difficulty breathing, SOB, abdominal pain, nausea, vomiting, diarrhea, dysuria, increasing weakness, increasing pain.     Past Medical History:   Diagnosis Date     Age related osteoporosis      Anxiety      Arthropathy      COVID-19      DM2 (diabetes mellitus, type 2) (H)      HTN (hypertension)      Hypothyroidism      Major depressive disorder      Mild intellectual disabilities              No family history on file.  Social History     Socioeconomic History     Marital status: Single  "    Spouse name: Not on file     Number of children: Not on file     Years of education: Not on file     Highest education level: Not on file   Occupational History     Not on file   Social Needs     Financial resource strain: Not on file     Food insecurity     Worry: Not on file     Inability: Not on file     Transportation needs     Medical: Not on file     Non-medical: Not on file   Tobacco Use     Smoking status: Not on file   Substance and Sexual Activity     Alcohol use: Not on file     Drug use: Not on file     Sexual activity: Not on file   Lifestyle     Physical activity     Days per week: Not on file     Minutes per session: Not on file     Stress: Not on file   Relationships     Social connections     Talks on phone: Not on file     Gets together: Not on file     Attends Hoahaoism service: Not on file     Active member of club or organization: Not on file     Attends meetings of clubs or organizations: Not on file     Relationship status: Not on file     Intimate partner violence     Fear of current or ex partner: Not on file     Emotionally abused: Not on file     Physically abused: Not on file     Forced sexual activity: Not on file   Other Topics Concern     Not on file   Social History Narrative     Not on file       REVIEW OF SYSTEM:  Per HPI    PHYSICAL EXAM:   /70   Pulse 65   Temp 97.3  F (36.3  C)   Resp 18   Ht 5' 6\" (1.676 m)   Wt (!) 232 lb (105.2 kg)   SpO2 90%   BMI 37.45 kg/m      A limited exam was performed due to recommendations for care during COVID-19 pandemic. Due to the 2020 COVID-19 pandemic, except as noted above, the patient was visually observed at a 6 foot plus distance.  An observational exam was performed in an effort to keep patient safe from COVID-19 and other communicable diseases.     General appearance: alert, appears stated age and cooperative  HEENT: Head is normocephalic with normal hair distribution. No evidence of trauma. Ears: Without lesions or " deformity. No acute purulent discharge. Eyes: Conjunctivae pink with no scleral icterus or erythema. Nose: Normal. Oropharnyx: mmm.  Lungs: respirations without effort, occasional harsh cough.  Extremities: extremities normal, atraumatic, no cyanosis.  Skin: Skin color, texture normal. No rashes or lesions on exposed skin.   Neurologic: Grossly normal   Psych: interacts well with caregivers, exhibits logical thought processes and connections with evidence of cognitive impairment, pleasant.      LABS:   None today.     ASSESSMENT:      ICD-10-CM    1. Arthropathy  M12.9    2. At risk for malnutrition  Z91.89    3. COVID-19  U07.1    4. Physical deconditioning  R53.81        PLAN:    Rozina continues to do quite well, discharge planning to begin.  Therapies are going well and should continue in the meantime.    Arthropathy, Physical Deconditioning  -Encourage Robitussin cough syrup for cough.  -Continue PT/OT and other therapies as per care plan.  -Encouraged good nutrition and movement habits.   -Discussed care plan and expected course of stay.   -Continue to follow-up per routine schedule or sooner if needed.     COVID-19 Infection  -COVID-19 PCR positive.   -Albuterol 2 puffs with chamber every 4 hours as needed for shortness of breath or wheeze.   -Tylenol 650 mg by mouth four times a day for fever, pain as needed.   -Tessalon Perles 100 mg by mouth 3 times daily as needed.  -Vitamin D3 5000 iu by mouth daily.   -Quarantine.   -Follow up routinely per TCU standards.     At risk for malnutrition  -Dietitian to eval and treat.  -Supplementation at needed.     Otherwise continue current care plan for all other chronic medical conditions, as they are stable. Encouraged patient to engage in healthy lifestyle behaviors such as engaging in social activities, exercising (PT/OT), eating well, and following care plan. Follow up for routine check-up, or sooner if needed. Will continue to monitor patient and work with nursing  staff collaboratively to work toward positive patient outcomes.    Electronically signed by: Julianna Craven CNP

## 2021-06-13 NOTE — TELEPHONE ENCOUNTER
[facility] Nurse Triage Telephone Note      Provider: SHELIA Lemon  Facility: [facility]    Facility Type: LTC    Caller: Ray  Call Back Number:  [phone]    Allergies: Patient has no known allergies.    Reason for call: Pt has cough, using Robitussin ineffective. Request Tessalon Pearles.     Verbal Order/Direction given by Provider: Tessalon Pearles 100mg three times a day PRN    Provider giving order: SHELIA Lemon    Verbal order given to: Chrissie Pineda RN

## 2021-06-13 NOTE — TELEPHONE ENCOUNTER
[facility] Nurse Triage Telephone Note      Provider: Michell Senior MD  Facility: [facility]    Facility Type: TCU    Caller: Bello  Call Back Number:  [phone]    Allergies: Patient has no known allergies.    Reason for call: Pt has a worsening cough, current on the covid unit with covid-19, new medication order for tessalon pearls prn which are helping a little but not much.  No shortness of breath, non-productive cough, LS diminished in bases with crackles.  Vitals: BP:  144/61  P:: 63  SPO2: 91% R/A        Verbal Order/Direction given by Provider: chest xray 2 views, BNP and CBC on 12/2    Provider giving order: Michell Senior MD    Verbal order given to: You Freeman RN

## 2021-06-13 NOTE — PROGRESS NOTES
[facility]    Facility:   [facility] [[id number]]   Code Status: POLST AVAILABLE    Reassessment of 75-year-old female who resides in group home, history of hypertension, significant intellectual delay, hyperlipidemia, hypothyroidism, depression, COVID-19 positive, group home unable to accommodate patient's needs, in [facility] Covid unit.    Review of systems: Denies cough sputum production fever sweats or chills.  States she feels well.  No myalgias.  Appetite excellent.  Remainder of 12 point review of systems obtained negative.  Nursing staff report patient with no apparent distress, oxygen not in use continually, oxygenation 90% or greater.    Exam: Afebrile, sitting in chair, oriented to person, not to place or time, eating cookies.  Oxygenation greater than 90%, supplemental O2 not in place.  Hemodynamically stable and afebrile.  No facial asymmetry, crowded oropharynx.  No use of accessory muscles at rest.  Exam is otherwise visual in view of viral outbreak, periphery with edema, obesity present, in no apparent distress.    Impression and plan:   COVID-19 PCR positivity, afebrile, oxygenation greater than 90%, no indication of respiratory distress, crowded oropharynx would suggest potential for upper airway impairment, patient with shallow inspiratory effort suggestive of obesity hypoventilation syndrome, continue to monitor, continue quarantine.    Hypertension on Cozaar and Lozol with satisfactory control of blood pressure.    Depression on Celexa, mood satisfactory.    Hyperlipidemia on statin.    Hypothyroidism on replacement.    Significant deconditioning with need for rehabilitation.      Electronically signed by: Michell Senior MD

## 2021-06-13 NOTE — PROGRESS NOTES
[facility]    Facility:   [place] [facility] [[id number]]   Code Status: POLST AVAILABLE    Admission evaluation to TCU of 75-year-old female.  All history is taken from accompanying notes, patient with intellectual disability is unable to provide any significant history.  Hospitalization with COVID-19, hemodynamically stable and oxygenation stable, transferred to quarantine unit [place].  History of osteoporosis, anxiety, hypertension, hypothyroidism, major depression, deconditioning.    Past medical history, current medical problem list, drug allergies, current medication list, social history and CODE STATUS reviewed in epic.  Family history unknown by patient.    Review of systems: Denies fever sweats or chills.  No cough or sputum production.  No sense of dyspnea at rest.  No orthopnea or PND.  Remainder of 12 point review of systems obtained negative.    Nursing staff report patient without behavioral issues, hemodynamically stable and with satisfactory oxygenation.    Exam: Patient sitting in chair, pleasant, oriented to person, not to place or time, cooperative, coloring books in front, eating cookies.  No evidence of respiratory distress.  Consistent with satisfactory blood pressure recordings oxygenation and pulse.  No facial asymmetry.  No use of accessory muscles at rest.  Mucous membranes moist.  Thyroid finely granular.  S1 and S2 regular.  Pulmonary exam with shallow inspiratory effort, no rales or wheezes.  Abdomen without masses or tenderness.  Periphery with negligible nonpitting edema.  No hand drift.    Impression and plan:   COVID-19, post hospitalization, satisfactory oxygenation, no evidence of respiratory distress, afebrile, continue quarantine.    Chronic major depression on Celexa 20 mg, no complaints of or signs of depression at present.    Hypothyroidism on Synthroid 75 MCG.    Hypertension on Cozaar 100 mg, Lozol 2.5 mg, blood pressure control satisfactory.     Urinary urgency on Ditropan XL.    Hyperlipidemia on Lipitor 20 mg.    Deconditioning with need for rehabilitation.    Intellectual disability, no behavioral abnormalities.    Medical records reviewed, medications reviewed, examination of patient.      Electronically signed by: Michell Senior MD

## 2021-06-13 NOTE — PROGRESS NOTES
[facility]    Facility:   [facility] [facility] [[id number]]   Code Status: POLST AVAILABLE  Review of 75-year-old female who continues in quarantine TCU at [place], recent COVID-19, hemodynamically stable and without  respiratory compromise, history of intellectual disability, major depression, hypothyroidism, hypertension.    Review of systems: Denies fever sweats or chills.  No cough or sputum production.  No myalgias.  No current pain.  Appetite adequate.  Remainder of 12 point review of systems obtained negative.    Nursing confirmed patient remains stable, oxygenation satisfactory, no current complaints.    Exam: Consistent oxygenation greater than 92%, satisfactory blood pressure control, patient sitting in chair in no apparent distress.  Coloring, displays her completed pictures.  Oriented to person, pleasant, no use except for accessory muscles at rest.  Mucous membranes moist.  Crowded oropharynx.  S1 and S2 regular.  Pulmonary exam with shallow inspiratory effort, no rales or wheezes.  Skin turgor intact.    Impression and plan:   Recent COVID-19, no indication of complication, satisfactory oxygenation, no symptoms of viral sequela, continue quarantine.    Hypertension on Lozol and Cozaar with satisfactory control of blood pressure.    Deconditioning with need for rehabilitation.    Hypothyroidism on replacement.    Intellectual disability, no behavioral abnormalities.        Electronically signed by: Michell Senior MD

## 2021-06-13 NOTE — PROGRESS NOTES
[facility]    Facility:   [facility] [[id number]]   Code Status: FULL CODE and POLST AVAILABLE      CHIEF COMPLAINT/REASON FOR VISIT:  Chief Complaint   Patient presents with     Review Of Multiple Medical Conditions     Arthropathy, COVID-19, physical deconditioning, at risk for malnutrition       HISTORY:      HPI: Rozina is a 75 y.o. female who  has a past medical history of Age related osteoporosis, Anxiety, Arthropathy, COVID-19, DM2 (diabetes mellitus, type 2) (H), HTN (hypertension), Hypothyroidism, Major depressive disorder, and Mild intellectual disabilities. Rozina was recently admitted to [facility] for COVID-19 treatment after coming from an assisted living facility who could no longer care for her due to COVID-19. Rozina was unable to give much information and due to the current COVID-19 pandemic history has been difficult to come by. Unable to find other information, call to patient's guardian-- her sister Carolina Bush-- however, no return call.     Today Rozina is being evaluated for a routine review of multiple medical problems while in the TCU.  She continues to do well.  She states that she is enjoying therapies.  She states that they are hard.  She states that she would rather sit in her chair and eat her snacks.  She has no new concerns or issues to report.  She states that she is otherwise been doing well and has been happy.  Nursing staff state that she is very stable.  Rozina denies any other concerns including fevers/chills, cough or cold symptoms, headaches, vision changes, chest pain/pressure, difficulty breathing, SOB, abdominal pain, nausea, vomiting, diarrhea, dysuria, increasing weakness, increasing pain.     Past Medical History:   Diagnosis Date     Age related osteoporosis      Anxiety      Arthropathy      COVID-19      DM2 (diabetes mellitus, type 2) (H)      HTN (hypertension)      Hypothyroidism      Major depressive disorder      Mild intellectual  disabilities              No family history on file.  Social History     Socioeconomic History     Marital status: Single     Spouse name: Not on file     Number of children: Not on file     Years of education: Not on file     Highest education level: Not on file   Occupational History     Not on file   Social Needs     Financial resource strain: Not on file     Food insecurity     Worry: Not on file     Inability: Not on file     Transportation needs     Medical: Not on file     Non-medical: Not on file   Tobacco Use     Smoking status: Not on file   Substance and Sexual Activity     Alcohol use: Not on file     Drug use: Not on file     Sexual activity: Not on file   Lifestyle     Physical activity     Days per week: Not on file     Minutes per session: Not on file     Stress: Not on file   Relationships     Social connections     Talks on phone: Not on file     Gets together: Not on file     Attends Pent[employer]al service: Not on file     Active member of club or organization: Not on file     Attends meetings of clubs or organizations: Not on file     Relationship status: Not on file     Intimate partner violence     Fear of current or ex partner: Not on file     Emotionally abused: Not on file     Physically abused: Not on file     Forced sexual activity: Not on file   Other Topics Concern     Not on file   Social History Narrative     Not on file       REVIEW OF SYSTEM:  Per HPI    PHYSICAL EXAM:   /88   Pulse 78   Temp 97.6  F (36.4  C)   Resp 16   Wt (!) 232 lb (105.2 kg)   SpO2 94%   BMI 37.45 kg/m      A limited exam was performed due to recommendations for care during COVID-19 pandemic. Due to the 2020 COVID-19 pandemic, except as noted above, the patient was visually observed at a 6 foot plus distance.  An observational exam was performed in an effort to keep patient safe from COVID-19 and other communicable diseases.     General appearance: alert, appears stated age and cooperative  HEENT: Head is  normocephalic with normal hair distribution. No evidence of trauma. Ears: Without lesions or deformity. No acute purulent discharge. Eyes: Conjunctivae pink with no scleral icterus or erythema. Nose: Normal. Oropharnyx: mmm.  Lungs: respirations without effort.  Extremities: extremities normal, atraumatic, no cyanosis.  Skin: Skin color, texture normal. No rashes or lesions on exposed skin.   Neurologic: Grossly normal   Psych: interacts well with caregivers, exhibits logical thought processes and connections with evidence of cognitive impairment, pleasant.      LABS:   None today.     ASSESSMENT:      ICD-10-CM    1. Arthropathy  M12.9    2. At risk for malnutrition  Z91.89    3. COVID-19  U07.1    4. Physical deconditioning  R53.81        PLAN:    Therapies to continue, care plan reviewed and remains appropriate.  Discharge planning to commence.    Arthropathy, Physical Deconditioning  -Continue PT/OT and other therapies as per care plan.  -Encouraged good nutrition and movement habits.   -Discussed care plan and expected course of stay.   -Continue to follow-up per routine schedule or sooner if needed.     COVID-19 Infection  -COVID-19 PCR positive.   -Albuterol 2 puffs with chamber every 4 hours as needed for shortness of breath or wheeze.   -Tylenol 650 mg by mouth four times a day for fever, pain as needed.   -Vitamin D3 5000 iu by mouth daily.   -Quarantine.   -Follow up routinely per TCU standards.     At risk for malnutrition  -Dietitian to eval and treat.  -Supplementation at needed.     Otherwise continue current care plan for all other chronic medical conditions, as they are stable. Encouraged patient to engage in healthy lifestyle behaviors such as engaging in social activities, exercising (PT/OT), eating well, and following care plan. Follow up for routine check-up, or sooner if needed. Will continue to monitor patient and work with nursing staff collaboratively to work toward positive patient  outcomes.    Electronically signed by: Julianna Craven CNP

## 2021-06-13 NOTE — PROGRESS NOTES
[facility]    Facility:   [facility] [[id number]]   Code Status: FULL CODE and POLST AVAILABLE      CHIEF COMPLAINT/REASON FOR VISIT:  Chief Complaint   Patient presents with     Review Of Multiple Medical Conditions     arthropathy, COVID-19, physical deconditioning, at risk for malnutrition       HISTORY:      HPI: Rozina is a 75 y.o. female who  has a past medical history of Age related osteoporosis, Anxiety, Arthropathy, COVID-19, DM2 (diabetes mellitus, type 2) (H), HTN (hypertension), Hypothyroidism, Major depressive disorder, and Mild intellectual disabilities. Rozina was recently admitted to [facility] for COVID-19 treatment after coming from an assisted living facility who could no longer care for her due to COVID-19. Rozina was unable to give much information and due to the current COVID-19 pandemic history has been difficult to come by.    Today Rozina is being evaluated for a routine review of multiple medical problems while in the TCU.  She continues to do quite well.  She states her only problem is her knee pain.  This has been a chronic issue.  Have discussed using Voltaren gel, she is agreeable to trying Voltaren gel for pain.  She has started to talk about going home.  She is performing well in therapies and is nearing decline, did discuss discharge pending early next week.  She continues to eat, drink and eliminate well.  Rozina denies any other concerns including fevers/chills, cough or cold symptoms, headaches, vision changes, chest pain/pressure, difficulty breathing, SOB, abdominal pain, nausea, vomiting, diarrhea, dysuria, increasing weakness, increasing pain.     Past Medical History:   Diagnosis Date     Age related osteoporosis      Anxiety      Arthropathy      COVID-19      DM2 (diabetes mellitus, type 2) (H)      HTN (hypertension)      Hypothyroidism      Major depressive disorder      Mild intellectual disabilities              No family history on  "file.  Social History     Socioeconomic History     Marital status: Single     Spouse name: Not on file     Number of children: Not on file     Years of education: Not on file     Highest education level: Not on file   Occupational History     Not on file   Social Needs     Financial resource strain: Not on file     Food insecurity     Worry: Not on file     Inability: Not on file     Transportation needs     Medical: Not on file     Non-medical: Not on file   Tobacco Use     Smoking status: Not on file   Substance and Sexual Activity     Alcohol use: Not on file     Drug use: Not on file     Sexual activity: Not on file   Lifestyle     Physical activity     Days per week: Not on file     Minutes per session: Not on file     Stress: Not on file   Relationships     Social connections     Talks on phone: Not on file     Gets together: Not on file     Attends Sabianist service: Not on file     Active member of club or organization: Not on file     Attends meetings of clubs or organizations: Not on file     Relationship status: Not on file     Intimate partner violence     Fear of current or ex partner: Not on file     Emotionally abused: Not on file     Physically abused: Not on file     Forced sexual activity: Not on file   Other Topics Concern     Not on file   Social History Narrative     Not on file       REVIEW OF SYSTEM:  Per HPI    PHYSICAL EXAM:   /65   Pulse 65   Temp 97  F (36.1  C)   Resp 16   Ht 5' 6\" (1.676 m)   Wt (!) 232 lb (105.2 kg)   SpO2 97%   BMI 37.45 kg/m      A limited exam was performed due to recommendations for care during COVID-19 pandemic. Due to the 2020 COVID-19 pandemic, except as noted above, the patient was visually observed at a 6 foot plus distance.  An observational exam was performed in an effort to keep patient safe from COVID-19 and other communicable diseases.     General appearance: alert, appears stated age and cooperative  HEENT: Head is normocephalic with normal " hair distribution. No evidence of trauma. Ears: Without lesions or deformity. No acute purulent discharge. Eyes: Conjunctivae pink with no scleral icterus or erythema. Nose: Normal. Oropharnyx: mmm.  Lungs: respirations without effort, occasional harsh cough.  Extremities: extremities normal, atraumatic, no cyanosis.  Skin: Skin color, texture normal. No rashes or lesions on exposed skin.   Neurologic: Grossly normal   Psych: interacts well with caregivers, exhibits logical thought processes and connections with evidence of cognitive impairment, pleasant.      LABS:   None today.     ASSESSMENT:      ICD-10-CM    1. Arthropathy  M12.9    2. At risk for malnutrition  Z91.89    3. COVID-19  U07.1    4. Physical deconditioning  R53.81        PLAN:    Rozina continues to do well, discharge planning has commenced.  Looking at a discharge early next week.    Arthropathy, Physical Deconditioning  -Voltaren gel 1%, apply 4 g to knees 3 times daily.  -Encourage Robitussin cough syrup for cough.  -Continue PT/OT and other therapies as per care plan.  -Encouraged good nutrition and movement habits.   -Discussed care plan and expected course of stay.   -Continue to follow-up per routine schedule or sooner if needed.     COVID-19 Infection  -COVID-19 PCR positive.   -Albuterol 2 puffs with chamber every 4 hours as needed for shortness of breath or wheeze.   -Tylenol 650 mg by mouth four times a day for fever, pain as needed.   -Tessalon Perles 100 mg by mouth 3 times daily as needed.  -Vitamin D3 5000 iu by mouth daily.   -Quarantine.   -Follow up routinely per U standards.     At risk for malnutrition  -Dietitian to eval and treat.  -Supplementation at needed.     Otherwise continue current care plan for all other chronic medical conditions, as they are stable. Encouraged patient to engage in healthy lifestyle behaviors such as engaging in social activities, exercising (PT/OT), eating well, and following care plan. Follow up  for routine check-up, or sooner if needed. Will continue to monitor patient and work with nursing staff collaboratively to work toward positive patient outcomes.    Electronically signed by: Julianna Craven CNP

## 2021-06-13 NOTE — PROGRESS NOTES
[facility]    Facility:   [facility] [[id number]]   Code Status: FULL CODE and POLST AVAILABLE      CHIEF COMPLAINT/REASON FOR VISIT:  Chief Complaint   Patient presents with     Review Of Multiple Medical Conditions     arthropathy, COVID-19, physical deconditioning, at risk for malnutrition       HISTORY:      HPI: Rozina is a 75 y.o. female who  has a past medical history of Age related osteoporosis, Anxiety, Arthropathy, COVID-19, DM2 (diabetes mellitus, type 2) (H), HTN (hypertension), Hypothyroidism, Major depressive disorder, and Mild intellectual disabilities. Rozina was recently admitted to [facility] for COVID-19 treatment after coming from an assisted living facility who could no longer care for her due to COVID-19. Rozina was unable to give much information and due to the current COVID-19 pandemic history has been difficult to come by.    Today Rozina is being evaluated for a routine review of multiple medical problems while in the TCU.  She continues to do well, has been participating in therapies albeit very little progress is being made.  Rozina shares she is feeling good.  She has been enjoying the facility.  Uncertain if she will be returning to her assisted living or group home at this time.  Likely a good candidate for long-term care.  She is quite sedentary and spends a great deal of time in a recliner watching television.  Rozina states she does like the facility.  She does not have any questions or concerns.  Nursing staff believe she has been stable.  She does seem to have an ongoing cough.  She does have the ability to use Robitussin or Tessalon Perles.  Encourage nursing staff to use this medication for her comfort.  Rozina denies any other concerns including fevers/chills, cough or cold symptoms, headaches, vision changes, chest pain/pressure, difficulty breathing, SOB, abdominal pain, nausea, vomiting, diarrhea, dysuria, increasing weakness, increasing pain.  "    Past Medical History:   Diagnosis Date     Age related osteoporosis      Anxiety      Arthropathy      COVID-19      DM2 (diabetes mellitus, type 2) (H)      HTN (hypertension)      Hypothyroidism      Major depressive disorder      Mild intellectual disabilities              No family history on file.  Social History     Socioeconomic History     Marital status: Single     Spouse name: Not on file     Number of children: Not on file     Years of education: Not on file     Highest education level: Not on file   Occupational History     Not on file   Social Needs     Financial resource strain: Not on file     Food insecurity     Worry: Not on file     Inability: Not on file     Transportation needs     Medical: Not on file     Non-medical: Not on file   Tobacco Use     Smoking status: Not on file   Substance and Sexual Activity     Alcohol use: Not on file     Drug use: Not on file     Sexual activity: Not on file   Lifestyle     Physical activity     Days per week: Not on file     Minutes per session: Not on file     Stress: Not on file   Relationships     Social connections     Talks on phone: Not on file     Gets together: Not on file     Attends Yarsani service: Not on file     Active member of club or organization: Not on file     Attends meetings of clubs or organizations: Not on file     Relationship status: Not on file     Intimate partner violence     Fear of current or ex partner: Not on file     Emotionally abused: Not on file     Physically abused: Not on file     Forced sexual activity: Not on file   Other Topics Concern     Not on file   Social History Narrative     Not on file       REVIEW OF SYSTEM:  Per HPI    PHYSICAL EXAM:   /61   Pulse 73   Temp 97.8  F (36.6  C)   Resp 16   Ht 5' 6\" (1.676 m)   Wt (!) 230 lb 9.6 oz (104.6 kg)   SpO2 90%   BMI 37.22 kg/m      A limited exam was performed due to recommendations for care during COVID-19 pandemic. Due to the 2020 COVID-19 pandemic, " except as noted above, the patient was visually observed at a 6 foot plus distance.  An observational exam was performed in an effort to keep patient safe from COVID-19 and other communicable diseases.     General appearance: alert, appears stated age and cooperative  HEENT: Head is normocephalic with normal hair distribution. No evidence of trauma. Ears: Without lesions or deformity. No acute purulent discharge. Eyes: Conjunctivae pink with no scleral icterus or erythema. Nose: Normal. Oropharnyx: mmm.  Lungs: respirations without effort, occasional harsh cough.  Extremities: extremities normal, atraumatic, no cyanosis.  Skin: Skin color, texture normal. No rashes or lesions on exposed skin.   Neurologic: Grossly normal   Psych: interacts well with caregivers, exhibits logical thought processes and connections with evidence of cognitive impairment, pleasant.      LABS:   None today.     ASSESSMENT:      ICD-10-CM    1. Arthropathy  M12.9    2. At risk for malnutrition  Z91.89    3. COVID-19  U07.1    4. Physical deconditioning  R53.81        PLAN:    Care plan reviewed, care conference to be scheduled for discharge planning.  Therapies to continue in the meantime.    Arthropathy, Physical Deconditioning  -Encourage Robitussin cough syrup for cough.  -Tessalon Perles 100 mg by mouth 3 times daily as needed.  -Continue PT/OT and other therapies as per care plan.  -Encouraged good nutrition and movement habits.   -Discussed care plan and expected course of stay.   -Continue to follow-up per routine schedule or sooner if needed.     COVID-19 Infection  -COVID-19 PCR positive.   -Albuterol 2 puffs with chamber every 4 hours as needed for shortness of breath or wheeze.   -Tylenol 650 mg by mouth four times a day for fever, pain as needed.   -Vitamin D3 5000 iu by mouth daily.   -Quarantine.   -Follow up routinely per U standards.     At risk for malnutrition  -Dietitian to eval and treat.  -Supplementation at needed.      Otherwise continue current care plan for all other chronic medical conditions, as they are stable. Encouraged patient to engage in healthy lifestyle behaviors such as engaging in social activities, exercising (PT/OT), eating well, and following care plan. Follow up for routine check-up, or sooner if needed. Will continue to monitor patient and work with nursing staff collaboratively to work toward positive patient outcomes.    Electronically signed by: Julianna Craven CNP

## 2021-06-13 NOTE — PROGRESS NOTES
[facility]    Facility:   [facility] [[id number]]   Code Status: FULL CODE and POLST AVAILABLE      CHIEF COMPLAINT/REASON FOR VISIT:  Chief Complaint   Patient presents with     Review Of Multiple Medical Conditions     arthropathy, COVID-19, physical deconditioning, at risk for malnutrition       HISTORY:      HPI: Rozina is a 75 y.o. female who  has a past medical history of Age related osteoporosis, Anxiety, Arthropathy, COVID-19, DM2 (diabetes mellitus, type 2) (H), HTN (hypertension), Hypothyroidism, Major depressive disorder, and Mild intellectual disabilities. Rozina was recently admitted to [facility] for COVID-19 treatment after coming from an assisted living facility who could no longer care for her due to COVID-19. Rozina was unable to give much information and due to the current COVID-19 pandemic history has been difficult to come by. Unable to find other information, call to patient's guardian-- her sister Carolina Bush-- however, no return call.     Today Rozina is being evaluated for a routine review of multiple medical problems while in the TCU. Rozina shares she is fine. Therapies are going ok per her report. She is pretty flat. She has no concerns or complaints except for the TV. She shares it isn't working really well. It is frustrating her-- we were able to get it working. Rozina has been eating well.. Rozina denies any other concerns including fevers/chills, cough or cold symptoms, headaches, vision changes, chest pain/pressure, difficulty breathing, SOB, abdominal pain, nausea, vomiting, diarrhea, dysuria, increasing weakness, increasing pain.     Past Medical History:   Diagnosis Date     Age related osteoporosis      Anxiety      Arthropathy      COVID-19      DM2 (diabetes mellitus, type 2) (H)      HTN (hypertension)      Hypothyroidism      Major depressive disorder      Mild intellectual disabilities              No family history on file.  Social History  "    Socioeconomic History     Marital status: Single     Spouse name: Not on file     Number of children: Not on file     Years of education: Not on file     Highest education level: Not on file   Occupational History     Not on file   Social Needs     Financial resource strain: Not on file     Food insecurity     Worry: Not on file     Inability: Not on file     Transportation needs     Medical: Not on file     Non-medical: Not on file   Tobacco Use     Smoking status: Not on file   Substance and Sexual Activity     Alcohol use: Not on file     Drug use: Not on file     Sexual activity: Not on file   Lifestyle     Physical activity     Days per week: Not on file     Minutes per session: Not on file     Stress: Not on file   Relationships     Social connections     Talks on phone: Not on file     Gets together: Not on file     Attends Orthodoxy service: Not on file     Active member of club or organization: Not on file     Attends meetings of clubs or organizations: Not on file     Relationship status: Not on file     Intimate partner violence     Fear of current or ex partner: Not on file     Emotionally abused: Not on file     Physically abused: Not on file     Forced sexual activity: Not on file   Other Topics Concern     Not on file   Social History Narrative     Not on file       REVIEW OF SYSTEM:  Per HPI    PHYSICAL EXAM:   /71   Pulse 86   Temp 98  F (36.7  C)   Resp 18   Ht 5' 6\" (1.676 m)   Wt (!) 246 lb 11.2 oz (111.9 kg)   SpO2 90%   BMI 39.82 kg/m      A limited exam was performed due to recommendations for care during COVID-19 pandemic. Due to the 2020 COVID-19 pandemic, except as noted above, the patient was visually observed at a 6 foot plus distance.  An observational exam was performed in an effort to keep patient safe from COVID-19 and other communicable diseases.     General appearance: alert, appears stated age and cooperative  HEENT: Head is normocephalic with normal hair " distribution. No evidence of trauma. Ears: Without lesions or deformity. No acute purulent discharge. Eyes: Conjunctivae pink with no scleral icterus or erythema. Nose: Normal. Oropharnyx: mmm.  Lungs: respirations without effort.  Extremities: extremities normal, atraumatic, no cyanosis.  Skin: Skin color, texture normal. No rashes or lesions on exposed skin.   Neurologic: Grossly normal   Psych: interacts well with caregivers, exhibits logical thought processes and connections, pleasant.      LABS:   None today.     ASSESSMENT:      ICD-10-CM    1. Arthropathy  M12.9    2. COVID-19  U07.1    3. At risk for malnutrition  Z91.89    4. Physical deconditioning  R53.81        PLAN:    Care plan reviewed and remains appropriate. Patient to continue with therapies.     Arthropathy, Physical Deconditioning  -Continue PT/OT and other therapies as per care plan.  -Encouraged good nutrition and movement habits.   -Discussed care plan and expected course of stay.   -Continue to follow-up per routine schedule or sooner if needed.     COVID-19 Infection  -COVID-19 PCR positive.   -Albuterol 2 puffs with chamber every 4 hours as needed for shortness of breath or wheeze.   -Tylenol 650 mg by mouth four times a day for fever, pain as needed.   -Vitamin D3 5000 iu by mouth daily.   -Quarantine.   -Follow up routinely per TCU standards.     At risk for malnutrition  -Dietitian to eval and treat.  -Supplementation at needed.     Otherwise continue current care plan for all other chronic medical conditions, as they are stable. Encouraged patient to engage in healthy lifestyle behaviors such as engaging in social activities, exercising (PT/OT), eating well, and following care plan. Follow up for routine check-up, or sooner if needed. Will continue to monitor patient and work with nursing staff collaboratively to work toward positive patient outcomes.    Electronically signed by: Julianna Craven CNP

## 2021-06-16 PROBLEM — R53.81 PHYSICAL DECONDITIONING: Status: ACTIVE | Noted: 2020-11-27

## 2021-06-16 PROBLEM — Z91.89 AT RISK FOR MALNUTRITION: Status: ACTIVE | Noted: 2020-11-21

## 2021-06-21 NOTE — LETTER
Letter by Julianna Craven CNP at      Author: Julianna Craven CNP Service: -- Author Type: --    Filed:  Encounter Date: 11/27/2020 Status: (Other)         Patient: Rozina Caraballo   MR Number: [id number]   YOB: 1945   Date of Visit: 11/27/2020     [facility]    Facility:   [facility] [[id number]]   Code Status: FULL CODE and POLST AVAILABLE      CHIEF COMPLAINT/REASON FOR VISIT:  Chief Complaint   Patient presents with   ? Review Of Multiple Medical Conditions     Arthropathy, COVID-19, physical deconditioning, at risk for malnutrition       HISTORY:      HPI: Rozina is a 75 y.o. female who  has a past medical history of Age related osteoporosis, Anxiety, Arthropathy, COVID-19, DM2 (diabetes mellitus, type 2) (H), HTN (hypertension), Hypothyroidism, Major depressive disorder, and Mild intellectual disabilities. Rozina was recently admitted to [facility] for COVID-19 treatment after coming from an assisted living facility who could no longer care for her due to COVID-19. Rozina was unable to give much information and due to the current COVID-19 pandemic history has been difficult to come by. Unable to find other information, call to patient's guardian-- her sister Carolina Bush-- however, no return call.     Today Rozina is being evaluated for a routine review of multiple medical problems while in the TCU.  She continues to do well.  She states that she is enjoying therapies.  She states that they are hard.  She states that she would rather sit in her chair and eat her snacks.  She has no new concerns or issues to report.  She states that she is otherwise been doing well and has been happy.  Nursing staff state that she is very stable.  Rozina denies any other concerns including fevers/chills, cough or cold symptoms, headaches, vision changes, chest pain/pressure, difficulty breathing, SOB, abdominal pain, nausea, vomiting, diarrhea, dysuria, increasing weakness, increasing  pain.     Past Medical History:   Diagnosis Date   ? Age related osteoporosis    ? Anxiety    ? Arthropathy    ? COVID-19    ? DM2 (diabetes mellitus, type 2) (H)    ? HTN (hypertension)    ? Hypothyroidism    ? Major depressive disorder    ? Mild intellectual disabilities              No family history on file.  Social History     Socioeconomic History   ? Marital status: Single     Spouse name: Not on file   ? Number of children: Not on file   ? Years of education: Not on file   ? Highest education level: Not on file   Occupational History   ? Not on file   Social Needs   ? Financial resource strain: Not on file   ? Food insecurity     Worry: Not on file     Inability: Not on file   ? Transportation needs     Medical: Not on file     Non-medical: Not on file   Tobacco Use   ? Smoking status: Not on file   Substance and Sexual Activity   ? Alcohol use: Not on file   ? Drug use: Not on file   ? Sexual activity: Not on file   Lifestyle   ? Physical activity     Days per week: Not on file     Minutes per session: Not on file   ? Stress: Not on file   Relationships   ? Social connections     Talks on phone: Not on file     Gets together: Not on file     Attends Yarsani service: Not on file     Active member of club or organization: Not on file     Attends meetings of clubs or organizations: Not on file     Relationship status: Not on file   ? Intimate partner violence     Fear of current or ex partner: Not on file     Emotionally abused: Not on file     Physically abused: Not on file     Forced sexual activity: Not on file   Other Topics Concern   ? Not on file   Social History Narrative   ? Not on file       REVIEW OF SYSTEM:  Per HPI    PHYSICAL EXAM:   /88   Pulse 78   Temp 97.6  F (36.4  C)   Resp 16   Wt (!) 232 lb (105.2 kg)   SpO2 94%   BMI 37.45 kg/m      A limited exam was performed due to recommendations for care during COVID-19 pandemic. Due to the 2020 COVID-19 pandemic, except as noted above,  the patient was visually observed at a 6 foot plus distance.  An observational exam was performed in an effort to keep patient safe from COVID-19 and other communicable diseases.     General appearance: alert, appears stated age and cooperative  HEENT: Head is normocephalic with normal hair distribution. No evidence of trauma. Ears: Without lesions or deformity. No acute purulent discharge. Eyes: Conjunctivae pink with no scleral icterus or erythema. Nose: Normal. Oropharnyx: mmm.  Lungs: respirations without effort.  Extremities: extremities normal, atraumatic, no cyanosis.  Skin: Skin color, texture normal. No rashes or lesions on exposed skin.   Neurologic: Grossly normal   Psych: interacts well with caregivers, exhibits logical thought processes and connections with evidence of cognitive impairment, pleasant.      LABS:   None today.     ASSESSMENT:      ICD-10-CM    1. Arthropathy  M12.9    2. At risk for malnutrition  Z91.89    3. COVID-19  U07.1    4. Physical deconditioning  R53.81        PLAN:    Therapies to continue, care plan reviewed and remains appropriate.  Discharge planning to commence.    Arthropathy, Physical Deconditioning  -Continue PT/OT and other therapies as per care plan.  -Encouraged good nutrition and movement habits.   -Discussed care plan and expected course of stay.   -Continue to follow-up per routine schedule or sooner if needed.     COVID-19 Infection  -COVID-19 PCR positive.   -Albuterol 2 puffs with chamber every 4 hours as needed for shortness of breath or wheeze.   -Tylenol 650 mg by mouth four times a day for fever, pain as needed.   -Vitamin D3 5000 iu by mouth daily.   -Quarantine.   -Follow up routinely per TCU standards.     At risk for malnutrition  -Dietitian to eval and treat.  -Supplementation at needed.     Otherwise continue current care plan for all other chronic medical conditions, as they are stable. Encouraged patient to engage in healthy lifestyle behaviors such  as engaging in social activities, exercising (PT/OT), eating well, and following care plan. Follow up for routine check-up, or sooner if needed. Will continue to monitor patient and work with nursing staff collaboratively to work toward positive patient outcomes.    Electronically signed by: Julianna Craven CNP

## 2021-06-21 NOTE — LETTER
Letter by Michell Senior MD at      Author: Michell Senior MD Service: -- Author Type: --    Filed:  Encounter Date: 11/23/2020 Status: (Other)         Patient: Rozina Caraballo   MR Number: [id number]   YOB: 1945   Date of Visit: 11/23/2020     [facility]    Facility:   [facility] [[id number]]   Code Status: POLST AVAILABLE  Review of 75-year-old female who continues in quarantine TCU at [place], recent COVID-19, hemodynamically stable and without  respiratory compromise, history of intellectual disability, major depression, hypothyroidism, hypertension.    Review of systems: Denies fever sweats or chills.  No cough or sputum production.  No myalgias.  No current pain.  Appetite adequate.  Remainder of 12 point review of systems obtained negative.    Nursing confirmed patient remains stable, oxygenation satisfactory, no current complaints.    Exam: Consistent oxygenation greater than 92%, satisfactory blood pressure control, patient sitting in chair in no apparent distress.  Coloring, displays her completed pictures.  Oriented to person, pleasant, no use except for accessory muscles at rest.  Mucous membranes moist.  Crowded oropharynx.  S1 and S2 regular.  Pulmonary exam with shallow inspiratory effort, no rales or wheezes.  Skin turgor intact.    Impression and plan:   Recent COVID-19, no indication of complication, satisfactory oxygenation, no symptoms of viral sequela, continue quarantine.    Hypertension on Lozol and Cozaar with satisfactory control of blood pressure.    Deconditioning with need for rehabilitation.    Hypothyroidism on replacement.    Intellectual disability, no behavioral abnormalities.        Electronically signed by: Michell Senior MD

## 2021-06-21 NOTE — LETTER
Letter by Julianna Craven CNP at      Author: Julianna Craven CNP Service: -- Author Type: --    Filed:  Encounter Date: 12/4/2020 Status: (Other)         [facility]  [address] [address]                                  December 10, 2020    Patient: Rozina Caraballo   MR Number: [id number]   YOB: 1945   Date of Visit: 12/4/2020     Dear Dr. Mercado:    Thank you for referring Rozina Caraballo to me for evaluation. Below are the relevant portions of my assessment and plan of care.    If you have questions, please do not hesitate to call me. I look forward to following Rozina along with you.    Sincerely,        Julianna Craven CNP          CC  No Recipients  Julianna Craven CNP  12/10/2020  7:59 AM  [facility]    Facility:   [facility] [[id number]]   Code Status: FULL CODE and POLST AVAILABLE      CHIEF COMPLAINT/REASON FOR VISIT:  Chief Complaint   Patient presents with   ? Problem Visit     COVID-19       HISTORY:      HPI: Rozina is a 75 y.o. female who  has a past medical history of Age related osteoporosis, Anxiety, Arthropathy, COVID-19, DM2 (diabetes mellitus, type 2) (H), HTN (hypertension), Hypothyroidism, Major depressive disorder, and Mild intellectual disabilities. Rozina was recently admitted to [facility] for COVID-19 treatment after coming from an assisted living facility who could no longer care for her due to COVID-19. Rozina was unable to give much information and due to the current COVID-19 pandemic history has been difficult to come by.    Today Rozina is being evaluated for a routine review of multiple medical problems while in the TCU.  She states she is doing good.  She has no new concerns or issues of her.  Established and stable.  Therapies report that things are going well.  She does not necessarily enjoy therapies but states she enjoys the therapists.  Rozina states she has no pains or aches.   She reports she has been eating, drinking and eliminating well.  We did discuss discharge planning and she is wanting to go back to her previous facility.  Rozina denies any other concerns including fevers/chills, cough or cold symptoms, headaches, vision changes, chest pain/pressure, difficulty breathing, SOB, abdominal pain, nausea, vomiting, diarrhea, dysuria, increasing weakness, increasing pain.     Past Medical History:   Diagnosis Date   ? Age related osteoporosis    ? Anxiety    ? Arthropathy    ? COVID-19    ? DM2 (diabetes mellitus, type 2) (H)    ? HTN (hypertension)    ? Hypothyroidism    ? Major depressive disorder    ? Mild intellectual disabilities              No family history on file.  Social History     Socioeconomic History   ? Marital status: Single     Spouse name: Not on file   ? Number of children: Not on file   ? Years of education: Not on file   ? Highest education level: Not on file   Occupational History   ? Not on file   Social Needs   ? Financial resource strain: Not on file   ? Food insecurity     Worry: Not on file     Inability: Not on file   ? Transportation needs     Medical: Not on file     Non-medical: Not on file   Tobacco Use   ? Smoking status: Not on file   Substance and Sexual Activity   ? Alcohol use: Not on file   ? Drug use: Not on file   ? Sexual activity: Not on file   Lifestyle   ? Physical activity     Days per week: Not on file     Minutes per session: Not on file   ? Stress: Not on file   Relationships   ? Social connections     Talks on phone: Not on file     Gets together: Not on file     Attends [employer]ist service: Not on file     Active member of club or organization: Not on file     Attends meetings of clubs or organizations: Not on file     Relationship status: Not on file   ? Intimate partner violence     Fear of current or ex partner: Not on file     Emotionally abused: Not on file     Physically abused: Not on file     Forced sexual activity: Not on file  "  Other Topics Concern   ? Not on file   Social History Narrative   ? Not on file       REVIEW OF SYSTEM:  Per HPI    PHYSICAL EXAM:   /70   Pulse 65   Temp 97.3  F (36.3  C)   Resp 18   Ht 5' 6\" (1.676 m)   Wt (!) 232 lb (105.2 kg)   SpO2 90%   BMI 37.45 kg/m      A limited exam was performed due to recommendations for care during COVID-19 pandemic. Due to the 2020 COVID-19 pandemic, except as noted above, the patient was visually observed at a 6 foot plus distance.  An observational exam was performed in an effort to keep patient safe from COVID-19 and other communicable diseases.     General appearance: alert, appears stated age and cooperative  HEENT: Head is normocephalic with normal hair distribution. No evidence of trauma. Ears: Without lesions or deformity. No acute purulent discharge. Eyes: Conjunctivae pink with no scleral icterus or erythema. Nose: Normal. Oropharnyx: mmm.  Lungs: respirations without effort, occasional harsh cough.  Extremities: extremities normal, atraumatic, no cyanosis.  Skin: Skin color, texture normal. No rashes or lesions on exposed skin.   Neurologic: Grossly normal   Psych: interacts well with caregivers, exhibits logical thought processes and connections with evidence of cognitive impairment, pleasant.      LABS:   None today.     ASSESSMENT:      ICD-10-CM    1. Arthropathy  M12.9    2. At risk for malnutrition  Z91.89    3. COVID-19  U07.1    4. Physical deconditioning  R53.81        PLAN:    Rozina continues to do quite well, discharge planning to begin.  Therapies are going well and should continue in the meantime.    Arthropathy, Physical Deconditioning  -Encourage Robitussin cough syrup for cough.  -Continue PT/OT and other therapies as per care plan.  -Encouraged good nutrition and movement habits.   -Discussed care plan and expected course of stay.   -Continue to follow-up per routine schedule or sooner if needed.     COVID-19 Infection  -COVID-19 PCR " positive.   -Albuterol 2 puffs with chamber every 4 hours as needed for shortness of breath or wheeze.   -Tylenol 650 mg by mouth four times a day for fever, pain as needed.   -Tessalon Perles 100 mg by mouth 3 times daily as needed.  -Vitamin D3 5000 iu by mouth daily.   -Quarantine.   -Follow up routinely per TCU standards.     At risk for malnutrition  -Dietitian to eval and treat.  -Supplementation at needed.     Otherwise continue current care plan for all other chronic medical conditions, as they are stable. Encouraged patient to engage in healthy lifestyle behaviors such as engaging in social activities, exercising (PT/OT), eating well, and following care plan. Follow up for routine check-up, or sooner if needed. Will continue to monitor patient and work with nursing staff collaboratively to work toward positive patient outcomes.    Electronically signed by: Julianna Craven CNP

## 2021-06-21 NOTE — LETTER
Letter by Julianna Craven CNP at      Author: Julianna Craven CNP Service: -- Author Type: --    Filed:  Encounter Date: 11/20/2020 Status: (Other)         Patient: Rozina Caraballo   MR Number: [id number]   YOB: 1945   Date of Visit: 11/20/2020     [facility]    Facility:   [facility] [[id number]]   Code Status: FULL CODE and POLST AVAILABLE      CHIEF COMPLAINT/REASON FOR VISIT:  Chief Complaint   Patient presents with   ? Review Of Multiple Medical Conditions     TCU intake--arthropathy, COVID-19, physical deconditioning, ACP       HISTORY:      HPI: Rozina is a 75 y.o. female who  has a past medical history of Age related osteoporosis, Anxiety, Arthropathy, COVID-19, DM2 (diabetes mellitus, type 2) (H), HTN (hypertension), Hypothyroidism, Major depressive disorder, and Mild intellectual disabilities. Rozina was recently admitted to [facility] for COVID-19 treatment after coming from an assisted living facility who could no longer care for her due to COVID-19. Rozina was unable to give much information and due to the current COVID-19 pandemic history has been difficult to come by. Unable to find other information, call to patient's guardian-- her sister Carolina Bush-- however, no return call.     Today Rozina is being evaluated for an intake into the TCU. Rozina shares she has been doing well. She has no acute issues to report. Rozina shares she has been doing well. She has been participating in therapies. She does not like them. States she is fine without them. She has not had any respiratory issues or concerns. Rozina was screened by dietitian who feels she is at risk for malnutrition due to COVID-19 and variable intake with potential weight loss for patient. Rozina will pay attention to what she is eating and attempt to eat as much as possible. Rozina denies any other concerns including fevers/chills, cough or cold symptoms, headaches, vision changes, chest pain/pressure,  difficulty breathing, SOB, abdominal pain, nausea, vomiting, diarrhea, dysuria, increasing weakness, increasing pain.     Advanced Care Planning  Spoke with Rozina regarding code status and advanced care planning. Rozina consented to discussion and is aware of possible copay. They are also aware of the necessity of this discussion due to TCU admission. Discussed that she would like to have full resuscitation efforts. she would also like to have treatment if she  were to fall ill. she would like full medical treatment for all medical issues. Her sister Carolina Bush, who is also her POA would decide for her/him if she  was unable to make medical decisions. she agrees to IV/IM antibiotics. she  is ok with a feeding tube. There are no [facility] obligations she  would like documented at this time. She is ok with organ donation.     Past Medical History:   Diagnosis Date   ? Age related osteoporosis    ? Anxiety    ? Arthropathy    ? COVID-19    ? DM2 (diabetes mellitus, type 2) (H)    ? HTN (hypertension)    ? Hypothyroidism    ? Major depressive disorder    ? Mild intellectual disabilities              No family history on file.  Social History     Socioeconomic History   ? Marital status: Single     Spouse name: Not on file   ? Number of children: Not on file   ? Years of education: Not on file   ? Highest education level: Not on file   Occupational History   ? Not on file   Social Needs   ? Financial resource strain: Not on file   ? Food insecurity     Worry: Not on file     Inability: Not on file   ? Transportation needs     Medical: Not on file     Non-medical: Not on file   Tobacco Use   ? Smoking status: Not on file   Substance and Sexual Activity   ? Alcohol use: Not on file   ? Drug use: Not on file   ? Sexual activity: Not on file   Lifestyle   ? Physical activity     Days per week: Not on file     Minutes per session: Not on file   ? Stress: Not on file   Relationships   ? Social connections     Talks on phone:  Not on file     Gets together: Not on file     Attends Religion service: Not on file     Active member of club or organization: Not on file     Attends meetings of clubs or organizations: Not on file     Relationship status: Not on file   ? Intimate partner violence     Fear of current or ex partner: Not on file     Emotionally abused: Not on file     Physically abused: Not on file     Forced sexual activity: Not on file   Other Topics Concern   ? Not on file   Social History Narrative   ? Not on file       REVIEW OF SYSTEM:  Per HPI    PHYSICAL EXAM:   /44   Pulse 89   Temp 97.9  F (36.6  C)   Resp 18   Wt (!) 246 lb 11.2 oz (111.9 kg)   SpO2 96%     A limited exam was performed due to recommendations for care during COVID-19 pandemic. Due to the 2020 COVID-19 pandemic, except as noted above, the patient was visually observed at a 6 foot plus distance.  An observational exam was performed in an effort to keep patient safe from COVID-19 and other communicable diseases.     General appearance: alert, appears stated age and cooperative  HEENT: Head is normocephalic with normal hair distribution. No evidence of trauma. Ears: Without lesions or deformity. No acute purulent discharge. Eyes: Conjunctivae pink with no scleral icterus or erythema. Nose: Normal. Oropharnyx: mmm.  Lungs: respirations without effort.  Extremities: extremities normal, atraumatic, no cyanosis.  Skin: Skin color, texture normal. No rashes or lesions on exposed skin.   Neurologic: Grossly normal   Psych: interacts well with caregivers, exhibits logical thought processes and connections, pleasant.      LABS:   None today.     ASSESSMENT:      ICD-10-CM    1. Arthropathy  M12.9    2. At risk for malnutrition  Z91.89    3. Infection due to 2019 novel coronavirus  U07.1 traMADoL (ULTRAM) 50 mg tablet   4. Physical deconditioning  R53.81    5. COVID-19  U07.1    6. ACP (advance care planning)  Z71.89        PLAN:    Arthropathy, Physical  Deconditioning  -Continue PT/OT and other therapies as per care plan.  -Encouraged good nutrition and movement habits.   -Discussed care plan and expected course of stay.   -Continue to follow-up per routine schedule or sooner if needed.     COVID-19 Infection  -COVID-19 PCR positive.   -Albuterol 2 puffs with chamber every 4 hours as needed for shortness of breath or wheeze.   -Tylenol 650 mg by mouth four times a day for fever, pain as needed.   -Vitamin D3 5000 iu by mouth daily.   -Quarantine.   -Follow up routinely per TCU standards.     At risk for malnutrition  -Dietitian to eval and treat.  -Supplementation at needed.     Admission history and physical per MD in the next 30 days. At this time continue current care plan for all chronic medical conditions, as they are stable. Encouraged patient to engage in PT/OT for strengthening and conditioning. Encouraged patient to work closely with nursing staff to ensure any medical complaints are quickly addressed. Follow up this week or sooner if needed. Will continue to monitor patient and work with nursing staff collaboratively to work toward positive patient outcomes.    Total unit/floor time of 50 minutes time consisted of the following: time spent with patient, examination of patient, reviewing the record including pertinent labs and completing documentation. More than 50% of this time was spent in coordination of care time with nursing staff and other healthcare providers, this time was spent on discussion/counseling on current care plan including medical management of chronic health problems and acute health problems, education pertaining to plan, and discussion of the goals of care pertaining to the current outlined plan with nursing staff and patient. An additional 16 minutes of time was spent discussing code status, wishes for end of life care and reviewing POLST from 1440 to 1456. POLST signed and left with nursing staff.     Electronically signed by:  Julianna Craven, CNP

## 2021-06-21 NOTE — LETTER
Letter by Julianna Craven CNP at      Author: Julianna Craven CNP Service: -- Author Type: --    Filed:  Encounter Date: 12/2/2020 Status: (Other)         [facility]  [address] [address]                                  December 8, 2020    Patient: Rozina Caraballo   MR Number: [id number]   YOB: 1945   Date of Visit: 12/2/2020     Dear Dr. Mercado:    Thank you for referring Rozina Caraballo to me for evaluation. Below are the relevant portions of my assessment and plan of care.    If you have questions, please do not hesitate to call me. I look forward to following Rozina along with you.    Sincerely,        Julianna Craven CNP          CC  No Recipients  Julianna Craven CNP  12/8/2020 10:14 AM  [facility]    Facility:   [place] [[id number]]   Code Status: FULL CODE and POLST AVAILABLE      CHIEF COMPLAINT/REASON FOR VISIT:  Chief Complaint   Patient presents with   ? Review Of Multiple Medical Conditions     arthropathy, COVID-19, physical deconditioning, at risk for malnutrition       HISTORY:      HPI: Rozina is a 75 y.o. female who  has a past medical history of Age related osteoporosis, Anxiety, Arthropathy, COVID-19, DM2 (diabetes mellitus, type 2) (H), HTN (hypertension), Hypothyroidism, Major depressive disorder, and Mild intellectual disabilities. Rozina was recently admitted to [facility] for COVID-19 treatment after coming from an assisted living facility who could no longer care for her due to COVID-19. Rozina was unable to give much information and due to the current COVID-19 pandemic history has been difficult to come by.    Today Rozina is being evaluated for a routine review of multiple medical problems while in the TCU.  She continues to do well, has been participating in therapies albeit very little progress is being made.  Rozina shares she is feeling good.  She has been enjoying the facility.   Uncertain if she will be returning to her assisted living or group home at this time.  Likely a good candidate for long-term care.  She is quite sedentary and spends a great deal of time in a recliner watching television.  Rozina states she does like the facility.  She does not have any questions or concerns.  Nursing staff believe she has been stable.  She does seem to have an ongoing cough.  She does have the ability to use Robitussin or Tessalon Perles.  Encourage nursing staff to use this medication for her comfort.  Rozina denies any other concerns including fevers/chills, cough or cold symptoms, headaches, vision changes, chest pain/pressure, difficulty breathing, SOB, abdominal pain, nausea, vomiting, diarrhea, dysuria, increasing weakness, increasing pain.     Past Medical History:   Diagnosis Date   ? Age related osteoporosis    ? Anxiety    ? Arthropathy    ? COVID-19    ? DM2 (diabetes mellitus, type 2) (H)    ? HTN (hypertension)    ? Hypothyroidism    ? Major depressive disorder    ? Mild intellectual disabilities              No family history on file.  Social History     Socioeconomic History   ? Marital status: Single     Spouse name: Not on file   ? Number of children: Not on file   ? Years of education: Not on file   ? Highest education level: Not on file   Occupational History   ? Not on file   Social Needs   ? Financial resource strain: Not on file   ? Food insecurity     Worry: Not on file     Inability: Not on file   ? Transportation needs     Medical: Not on file     Non-medical: Not on file   Tobacco Use   ? Smoking status: Not on file   Substance and Sexual Activity   ? Alcohol use: Not on file   ? Drug use: Not on file   ? Sexual activity: Not on file   Lifestyle   ? Physical activity     Days per week: Not on file     Minutes per session: Not on file   ? Stress: Not on file   Relationships   ? Social connections     Talks on phone: Not on file     Gets together: Not on file     Attends  "Mu-ism service: Not on file     Active member of club or organization: Not on file     Attends meetings of clubs or organizations: Not on file     Relationship status: Not on file   ? Intimate partner violence     Fear of current or ex partner: Not on file     Emotionally abused: Not on file     Physically abused: Not on file     Forced sexual activity: Not on file   Other Topics Concern   ? Not on file   Social History Narrative   ? Not on file       REVIEW OF SYSTEM:  Per HPI    PHYSICAL EXAM:   /61   Pulse 73   Temp 97.8  F (36.6  C)   Resp 16   Ht 5' 6\" (1.676 m)   Wt (!) 230 lb 9.6 oz (104.6 kg)   SpO2 90%   BMI 37.22 kg/m      A limited exam was performed due to recommendations for care during COVID-19 pandemic. Due to the 2020 COVID-19 pandemic, except as noted above, the patient was visually observed at a 6 foot plus distance.  An observational exam was performed in an effort to keep patient safe from COVID-19 and other communicable diseases.     General appearance: alert, appears stated age and cooperative  HEENT: Head is normocephalic with normal hair distribution. No evidence of trauma. Ears: Without lesions or deformity. No acute purulent discharge. Eyes: Conjunctivae pink with no scleral icterus or erythema. Nose: Normal. Oropharnyx: mmm.  Lungs: respirations without effort, occasional harsh cough.  Extremities: extremities normal, atraumatic, no cyanosis.  Skin: Skin color, texture normal. No rashes or lesions on exposed skin.   Neurologic: Grossly normal   Psych: interacts well with caregivers, exhibits logical thought processes and connections with evidence of cognitive impairment, pleasant.      LABS:   None today.     ASSESSMENT:      ICD-10-CM    1. Arthropathy  M12.9    2. At risk for malnutrition  Z91.89    3. COVID-19  U07.1    4. Physical deconditioning  R53.81        PLAN:    Care plan reviewed, care conference to be scheduled for discharge planning.  Therapies to continue in " the meantime.    Arthropathy, Physical Deconditioning  -Encourage Robitussin cough syrup for cough.  -Tessalon Perles 100 mg by mouth 3 times daily as needed.  -Continue PT/OT and other therapies as per care plan.  -Encouraged good nutrition and movement habits.   -Discussed care plan and expected course of stay.   -Continue to follow-up per routine schedule or sooner if needed.     COVID-19 Infection  -COVID-19 PCR positive.   -Albuterol 2 puffs with chamber every 4 hours as needed for shortness of breath or wheeze.   -Tylenol 650 mg by mouth four times a day for fever, pain as needed.   -Vitamin D3 5000 iu by mouth daily.   -Quarantine.   -Follow up routinely per TCU standards.     At risk for malnutrition  -Dietitian to eval and treat.  -Supplementation at needed.     Otherwise continue current care plan for all other chronic medical conditions, as they are stable. Encouraged patient to engage in healthy lifestyle behaviors such as engaging in social activities, exercising (PT/OT), eating well, and following care plan. Follow up for routine check-up, or sooner if needed. Will continue to monitor patient and work with nursing staff collaboratively to work toward positive patient outcomes.    Electronically signed by: Julianna Craven CNP

## 2021-06-21 NOTE — LETTER
Letter by Julianna Craven CNP at      Author: Julianna Craven CNP Service: -- Author Type: --    Filed:  Encounter Date: 12/9/2020 Status: (Other)         [facility]  [address] [address]                                  December 13, 2020    Patient: Rozina Caraballo   MR Number: [id number]   YOB: 1945   Date of Visit: 12/9/2020     Dear Dr. Mercado:    Thank you for referring Rozina Caraballo to me for evaluation. Below are the relevant portions of my assessment and plan of care.    If you have questions, please do not hesitate to call me. I look forward to following Rozina along with you.    Sincerely,        Julianna Craven CNP          CC  No Recipients  Julianna Craven CNP  12/13/2020  7:19 PM  [facility]    Facility:   [facility] [[id number]]   Code Status: FULL CODE and POLST AVAILABLE      CHIEF COMPLAINT/REASON FOR VISIT:  Chief Complaint   Patient presents with   ? Review Of Multiple Medical Conditions     arthropathy, COVID-19, physical deconditioning, at risk for malnutrition       HISTORY:      HPI: Rozina is a 75 y.o. female who  has a past medical history of Age related osteoporosis, Anxiety, Arthropathy, COVID-19, DM2 (diabetes mellitus, type 2) (H), HTN (hypertension), Hypothyroidism, Major depressive disorder, and Mild intellectual disabilities. Rozina was recently admitted to [facility] for COVID-19 treatment after coming from an assisted living facility who could no longer care for her due to COVID-19. Rozina was unable to give much information and due to the current COVID-19 pandemic history has been difficult to come by.    Today Rozina is being evaluated for a routine review of multiple medical problems while in the TCU.  She continues to do quite well.  She states her only problem is her knee pain.  This has been a chronic issue.  Have discussed using Voltaren gel, she is agreeable to trying  Voltaren gel for pain.  She has started to talk about going home.  She is performing well in therapies and is nearing decline, did discuss discharge pending early next week.  She continues to eat, drink and eliminate well.  Rozina denies any other concerns including fevers/chills, cough or cold symptoms, headaches, vision changes, chest pain/pressure, difficulty breathing, SOB, abdominal pain, nausea, vomiting, diarrhea, dysuria, increasing weakness, increasing pain.     Past Medical History:   Diagnosis Date   ? Age related osteoporosis    ? Anxiety    ? Arthropathy    ? COVID-19    ? DM2 (diabetes mellitus, type 2) (H)    ? HTN (hypertension)    ? Hypothyroidism    ? Major depressive disorder    ? Mild intellectual disabilities              No family history on file.  Social History     Socioeconomic History   ? Marital status: Single     Spouse name: Not on file   ? Number of children: Not on file   ? Years of education: Not on file   ? Highest education level: Not on file   Occupational History   ? Not on file   Social Needs   ? Financial resource strain: Not on file   ? Food insecurity     Worry: Not on file     Inability: Not on file   ? Transportation needs     Medical: Not on file     Non-medical: Not on file   Tobacco Use   ? Smoking status: Not on file   Substance and Sexual Activity   ? Alcohol use: Not on file   ? Drug use: Not on file   ? Sexual activity: Not on file   Lifestyle   ? Physical activity     Days per week: Not on file     Minutes per session: Not on file   ? Stress: Not on file   Relationships   ? Social connections     Talks on phone: Not on file     Gets together: Not on file     Attends Protestant service: Not on file     Active member of club or organization: Not on file     Attends meetings of clubs or organizations: Not on file     Relationship status: Not on file   ? Intimate partner violence     Fear of current or ex partner: Not on file     Emotionally abused: Not on file      "Physically abused: Not on file     Forced sexual activity: Not on file   Other Topics Concern   ? Not on file   Social History Narrative   ? Not on file       REVIEW OF SYSTEM:  Per HPI    PHYSICAL EXAM:   /65   Pulse 65   Temp 97  F (36.1  C)   Resp 16   Ht 5' 6\" (1.676 m)   Wt (!) 232 lb (105.2 kg)   SpO2 97%   BMI 37.45 kg/m      A limited exam was performed due to recommendations for care during COVID-19 pandemic. Due to the 2020 COVID-19 pandemic, except as noted above, the patient was visually observed at a 6 foot plus distance.  An observational exam was performed in an effort to keep patient safe from COVID-19 and other communicable diseases.     General appearance: alert, appears stated age and cooperative  HEENT: Head is normocephalic with normal hair distribution. No evidence of trauma. Ears: Without lesions or deformity. No acute purulent discharge. Eyes: Conjunctivae pink with no scleral icterus or erythema. Nose: Normal. Oropharnyx: mmm.  Lungs: respirations without effort, occasional harsh cough.  Extremities: extremities normal, atraumatic, no cyanosis.  Skin: Skin color, texture normal. No rashes or lesions on exposed skin.   Neurologic: Grossly normal   Psych: interacts well with caregivers, exhibits logical thought processes and connections with evidence of cognitive impairment, pleasant.      LABS:   None today.     ASSESSMENT:      ICD-10-CM    1. Arthropathy  M12.9    2. At risk for malnutrition  Z91.89    3. COVID-19  U07.1    4. Physical deconditioning  R53.81        PLAN:    Rozina continues to do well, discharge planning has commenced.  Looking at a discharge early next week.    Arthropathy, Physical Deconditioning  -Voltaren gel 1%, apply 4 g to knees 3 times daily.  -Encourage Robitussin cough syrup for cough.  -Continue PT/OT and other therapies as per care plan.  -Encouraged good nutrition and movement habits.   -Discussed care plan and expected course of stay.   -Continue to " follow-up per routine schedule or sooner if needed.     COVID-19 Infection  -COVID-19 PCR positive.   -Albuterol 2 puffs with chamber every 4 hours as needed for shortness of breath or wheeze.   -Tylenol 650 mg by mouth four times a day for fever, pain as needed.   -Tessalon Perles 100 mg by mouth 3 times daily as needed.  -Vitamin D3 5000 iu by mouth daily.   -Quarantine.   -Follow up routinely per TCU standards.     At risk for malnutrition  -Dietitian to eval and treat.  -Supplementation at needed.     Otherwise continue current care plan for all other chronic medical conditions, as they are stable. Encouraged patient to engage in healthy lifestyle behaviors such as engaging in social activities, exercising (PT/OT), eating well, and following care plan. Follow up for routine check-up, or sooner if needed. Will continue to monitor patient and work with nursing staff collaboratively to work toward positive patient outcomes.    Electronically signed by: Julianna Craven CNP

## 2021-06-21 NOTE — LETTER
Letter by Michell Senior MD at      Author: Michell Senior MD Service: -- Author Type: --    Filed:  Encounter Date: 12/7/2020 Status: (Other)         Patient: Rozina Caraballo   MR Number: [id number]   YOB: 1945   Date of Visit: 12/7/2020     [facility]    Facility:   [facility] [[id number]]   Code Status: POLST AVAILABLE    Reevaluation of 75-year-old female who continues in quarantine with COVID-19 PCR positivity, history of intellectual delay, hypertension, depression, deconditioning, recent increase in cough, cough now decreasing, continues supplemental O2 1 L.    Review of systems: No current cough by patient report.  Nursing report patient has minimal cough.  Denies dyspnea orthopnea or PND.  No fever sweats or chills.  No focal neurologic deficits of new onset.  Energy level at baseline.  Generalized fatigue present.  Remainder of 12 point review of systems obtained negative.    Exam: Sitting in chair, no evidence of respiratory distress, crowded oropharynx.  Blood pressure 164/70, heart rate 65, temperature 97.3, O2 90% on 1 L.  S1 and S2 regular.  Pulmonary exam with shallow inspiratory effort, no rales or wheezes.  Periphery with nonpitting edema 1 mm.    Impression and plan:   Continued hypoxia requiring supplemental O2, shallow inspiratory effort consistent with obesity hypoventilation syndrome, continue to encourage deep breathing, inhalers as necessary, remains afebrile.    Hypertension on Cozaar and Lozol, intermittent elevation of blood pressure recording, overall satisfactory control.    Intellectual delay chronic, no evidence of progression.    Significant deconditioning with ongoing need for rehabilitation.    Issues reviewed with patient and nursing staff.      Electronically signed by: Michell Senior MD

## 2021-06-21 NOTE — LETTER
Letter by Michell Senior MD at      Author: Michell Senior MD Service: -- Author Type: --    Filed:  Encounter Date: 11/30/2020 Status: (Other)         Patient: Rozina Caraballo   MR Number: [id number]   YOB: 1945   Date of Visit: 11/30/2020     [facility]    Facility:   [facility] [[id number]]   Code Status: POLST AVAILABLE    Reassessment of 75-year-old female who resides in group home, history of hypertension, significant intellectual delay, hyperlipidemia, hypothyroidism, depression, COVID-19 positive, group home unable to accommodate patient's needs, in [facility] Covid unit.    Review of systems: Denies cough sputum production fever sweats or chills.  States she feels well.  No myalgias.  Appetite excellent.  Remainder of 12 point review of systems obtained negative.  Nursing staff report patient with no apparent distress, oxygen not in use continually, oxygenation 90% or greater.    Exam: Afebrile, sitting in chair, oriented to person, not to place or time, eating cookies.  Oxygenation greater than 90%, supplemental O2 not in place.  Hemodynamically stable and afebrile.  No facial asymmetry, crowded oropharynx.  No use of accessory muscles at rest.  Exam is otherwise visual in view of viral outbreak, periphery with edema, obesity present, in no apparent distress.    Impression and plan:   COVID-19 PCR positivity, afebrile, oxygenation greater than 90%, no indication of respiratory distress, crowded oropharynx would suggest potential for upper airway impairment, patient with shallow inspiratory effort suggestive of obesity hypoventilation syndrome, continue to monitor, continue quarantine.    Hypertension on Cozaar and Lozol with satisfactory control of blood pressure.    Depression on Celexa, mood satisfactory.    Hyperlipidemia on statin.    Hypothyroidism on replacement.    Significant deconditioning with need for rehabilitation.      Electronically  signed by: Michell Senior MD

## 2021-06-21 NOTE — LETTER
Letter by Michell Senior MD at      Author: Michell Senior MD Service: -- Author Type: --    Filed:  Encounter Date: 11/19/2020 Status: (Other)         Patient: Rozina Caraballo   MR Number: [id number]   YOB: 1945   Date of Visit: 11/19/2020     [facility]    Facility:   [facility] [[id number]]   Code Status: POLST AVAILABLE    Admission evaluation to TCU of 75-year-old female.  All history is taken from accompanying notes, patient with intellectual disability is unable to provide any significant history.  Hospitalization with COVID-19, hemodynamically stable and oxygenation stable, transferred to quarantine unit [place].  History of osteoporosis, anxiety, hypertension, hypothyroidism, major depression, deconditioning.    Past medical history, current medical problem list, drug allergies, current medication list, social history and CODE STATUS reviewed in epic.  Family history unknown by patient.    Review of systems: Denies fever sweats or chills.  No cough or sputum production.  No sense of dyspnea at rest.  No orthopnea or PND.  Remainder of 12 point review of systems obtained negative.    Nursing staff report patient without behavioral issues, hemodynamically stable and with satisfactory oxygenation.    Exam: Patient sitting in chair, pleasant, oriented to person, not to place or time, cooperative, coloring books in front, eating cookies.  No evidence of respiratory distress.  Consistent with satisfactory blood pressure recordings oxygenation and pulse.  No facial asymmetry.  No use of accessory muscles at rest.  Mucous membranes moist.  Thyroid finely granular.  S1 and S2 regular.  Pulmonary exam with shallow inspiratory effort, no rales or wheezes.  Abdomen without masses or tenderness.  Periphery with negligible nonpitting edema.  No hand drift.    Impression and plan:   COVID-19, post hospitalization, satisfactory oxygenation, no evidence of respiratory  distress, afebrile, continue quarantine.    Chronic major depression on Celexa 20 mg, no complaints of or signs of depression at present.    Hypothyroidism on Synthroid 75 MCG.    Hypertension on Cozaar 100 mg, Lozol 2.5 mg, blood pressure control satisfactory.    Urinary urgency on Ditropan XL.    Hyperlipidemia on Lipitor 20 mg.    Deconditioning with need for rehabilitation.    Intellectual disability, no behavioral abnormalities.    Medical records reviewed, medications reviewed, examination of patient.      Electronically signed by: Michell Senior MD

## 2021-06-21 NOTE — LETTER
Letter by Michell Senior MD at      Author: Michell Senior MD Service: -- Author Type: --    Filed:  Encounter Date: 12/3/2020 Status: (Other)         Patient: Rozina Caraballo   MR Number: [id number]   YOB: 1945   Date of Visit: 12/3/2020     [facility]    Facility:   [facility] [[id number]]   Code Status: POLST AVAILABLE    Reassessment of 75-year-old female who continues in quarantine, COVID-19 positive diagnosis, history of intellectual delay, depression, hypertension, requiring supplemental O2.  Order for chest x-ray 48 hours ago.  Recent hypoxia,nursing report patient continues to require supplemental O2, remains afebrile.    Review of systems: Denies fever sweats or chills.  No sense of dyspnea.  Appetite adequate.  No nausea or vomiting.  Generalized weakness present.  Remainder of 12 point review of systems obtained negative, questionably reliable in view of intellectual delay.    Exam: Patient sitting in chair, supplemental O2 in place, no evidence of respiratory distress.  O2 90% on 1 L.  Blood pressure 112/6 2, heart rate 73.  Crowded oropharynx, no pharyngeal erythema.  S1 and S2 regular.  Pulmonary exam with shallow inspiratory effort, minimally audible dry crackles extreme lower lung bases.    Impression and plan: Recent COVID-19, continued hypoxia, afebrile, no evidence of acute respiratory process, BNP normal, no clinical evidence of cardiac decompensation, obesity hypoventilation, continue to encourage deep breathing, continue cautious observation.    Hypertension on Cozaar and Lozol with satisfactory control of blood pressure.    Chronic depression, mood at baseline.    Significant deconditioning with ongoing need for rehabilitation.      Electronically signed by: Michell Senior MD

## 2021-06-21 NOTE — LETTER
Letter by Julianna Craven CNP at      Author: Julianna Craven CNP Service: -- Author Type: --    Filed:  Encounter Date: 11/25/2020 Status: (Other)         [facility]  [address] [address]                                  December 1, 2020    Patient: Rozina Caraballo   MR Number: [id number]   YOB: 1945   Date of Visit: 11/25/2020     Dear Dr. Mercado:    Thank you for referring Rozina Caraballo to me for evaluation. Below are the relevant portions of my assessment and plan of care.    If you have questions, please do not hesitate to call me. I look forward to following Rozina along with you.    Sincerely,        Julianna Craven CNP          CC  No Recipients  Julianna Craven CNP  12/1/2020  7:34 PM  [facility]    Facility:   [facility] [[id number]]   Code Status: FULL CODE and POLST AVAILABLE      CHIEF COMPLAINT/REASON FOR VISIT:  Chief Complaint   Patient presents with   ? Review Of Multiple Medical Conditions     arthropathy, COVID-19, physical deconditioning, at risk for malnutrition       HISTORY:      HPI: Rozina is a 75 y.o. female who  has a past medical history of Age related osteoporosis, Anxiety, Arthropathy, COVID-19, DM2 (diabetes mellitus, type 2) (H), HTN (hypertension), Hypothyroidism, Major depressive disorder, and Mild intellectual disabilities. Rozina was recently admitted to [facility] for COVID-19 treatment after coming from an assisted living facility who could no longer care for her due to COVID-19. Rozina was unable to give much information and due to the current COVID-19 pandemic history has been difficult to come by. Unable to find other information, call to patient's guardian-- her sister Carolina Bush-- however, no return call.     Today Rozina is being evaluated for a routine review of multiple medical problems while in the TCU. Rozina shares she is fine. Therapies are going ok per her  report. She is pretty flat. She has no concerns or complaints except for the TV. She shares it isn't working really well. It is frustrating her-- we were able to get it working. Rozina has been eating well.. Rozina denies any other concerns including fevers/chills, cough or cold symptoms, headaches, vision changes, chest pain/pressure, difficulty breathing, SOB, abdominal pain, nausea, vomiting, diarrhea, dysuria, increasing weakness, increasing pain.     Past Medical History:   Diagnosis Date   ? Age related osteoporosis    ? Anxiety    ? Arthropathy    ? COVID-19    ? DM2 (diabetes mellitus, type 2) (H)    ? HTN (hypertension)    ? Hypothyroidism    ? Major depressive disorder    ? Mild intellectual disabilities              No family history on file.  Social History     Socioeconomic History   ? Marital status: Single     Spouse name: Not on file   ? Number of children: Not on file   ? Years of education: Not on file   ? Highest education level: Not on file   Occupational History   ? Not on file   Social Needs   ? Financial resource strain: Not on file   ? Food insecurity     Worry: Not on file     Inability: Not on file   ? Transportation needs     Medical: Not on file     Non-medical: Not on file   Tobacco Use   ? Smoking status: Not on file   Substance and Sexual Activity   ? Alcohol use: Not on file   ? Drug use: Not on file   ? Sexual activity: Not on file   Lifestyle   ? Physical activity     Days per week: Not on file     Minutes per session: Not on file   ? Stress: Not on file   Relationships   ? Social connections     Talks on phone: Not on file     Gets together: Not on file     Attends [employer] service: Not on file     Active member of club or organization: Not on file     Attends meetings of clubs or organizations: Not on file     Relationship status: Not on file   ? Intimate partner violence     Fear of current or ex partner: Not on file     Emotionally abused: Not on file     Physically abused: Not on  "file     Forced sexual activity: Not on file   Other Topics Concern   ? Not on file   Social History Narrative   ? Not on file       REVIEW OF SYSTEM:  Per HPI    PHYSICAL EXAM:   /71   Pulse 86   Temp 98  F (36.7  C)   Resp 18   Ht 5' 6\" (1.676 m)   Wt (!) 246 lb 11.2 oz (111.9 kg)   SpO2 90%   BMI 39.82 kg/m      A limited exam was performed due to recommendations for care during COVID-19 pandemic. Due to the 2020 COVID-19 pandemic, except as noted above, the patient was visually observed at a 6 foot plus distance.  An observational exam was performed in an effort to keep patient safe from COVID-19 and other communicable diseases.     General appearance: alert, appears stated age and cooperative  HEENT: Head is normocephalic with normal hair distribution. No evidence of trauma. Ears: Without lesions or deformity. No acute purulent discharge. Eyes: Conjunctivae pink with no scleral icterus or erythema. Nose: Normal. Oropharnyx: mmm.  Lungs: respirations without effort.  Extremities: extremities normal, atraumatic, no cyanosis.  Skin: Skin color, texture normal. No rashes or lesions on exposed skin.   Neurologic: Grossly normal   Psych: interacts well with caregivers, exhibits logical thought processes and connections, pleasant.      LABS:   None today.     ASSESSMENT:      ICD-10-CM    1. Arthropathy  M12.9    2. COVID-19  U07.1    3. At risk for malnutrition  Z91.89    4. Physical deconditioning  R53.81        PLAN:    Care plan reviewed and remains appropriate. Patient to continue with therapies.     Arthropathy, Physical Deconditioning  -Continue PT/OT and other therapies as per care plan.  -Encouraged good nutrition and movement habits.   -Discussed care plan and expected course of stay.   -Continue to follow-up per routine schedule or sooner if needed.     COVID-19 Infection  -COVID-19 PCR positive.   -Albuterol 2 puffs with chamber every 4 hours as needed for shortness of breath or wheeze. "   -Tylenol 650 mg by mouth four times a day for fever, pain as needed.   -Vitamin D3 5000 iu by mouth daily.   -Quarantine.   -Follow up routinely per U standards.     At risk for malnutrition  -Dietitian to eval and treat.  -Supplementation at needed.     Otherwise continue current care plan for all other chronic medical conditions, as they are stable. Encouraged patient to engage in healthy lifestyle behaviors such as engaging in social activities, exercising (PT/OT), eating well, and following care plan. Follow up for routine check-up, or sooner if needed. Will continue to monitor patient and work with nursing staff collaboratively to work toward positive patient outcomes.    Electronically signed by: Julianna Craven CNP

## 2021-09-16 ENCOUNTER — LAB REQUISITION (OUTPATIENT)
Dept: LAB | Facility: CLINIC | Age: 76
End: 2021-09-16
Payer: COMMERCIAL

## 2021-09-16 DIAGNOSIS — G47.10 HYPERSOMNIA, UNSPECIFIED: ICD-10-CM

## 2021-09-16 DIAGNOSIS — E78.5 HYPERLIPIDEMIA, UNSPECIFIED: ICD-10-CM

## 2021-09-16 DIAGNOSIS — N18.30 CHRONIC KIDNEY DISEASE, STAGE 3 UNSPECIFIED (H): ICD-10-CM

## 2021-09-16 DIAGNOSIS — E08.9 DIABETES MELLITUS DUE TO UNDERLYING CONDITION WITHOUT COMPLICATIONS (H): ICD-10-CM

## 2021-09-16 DIAGNOSIS — E11.9 TYPE 2 DIABETES MELLITUS WITHOUT COMPLICATIONS (H): ICD-10-CM

## 2021-09-17 LAB
ALBUMIN SERPL-MCNC: 3.4 G/DL (ref 3.5–5)
ANION GAP SERPL CALCULATED.3IONS-SCNC: 14 MMOL/L (ref 5–18)
BUN SERPL-MCNC: 21 MG/DL (ref 8–28)
CALCIUM SERPL-MCNC: 9.6 MG/DL (ref 8.5–10.5)
CHLORIDE BLD-SCNC: 100 MMOL/L (ref 98–107)
CHOLEST SERPL-MCNC: 156 MG/DL
CO2 SERPL-SCNC: 27 MMOL/L (ref 22–31)
CREAT SERPL-MCNC: 0.92 MG/DL (ref 0.6–1.1)
ERYTHROCYTE [DISTWIDTH] IN BLOOD BY AUTOMATED COUNT: 12.7 % (ref 10–15)
FASTING STATUS PATIENT QL REPORTED: ABNORMAL
GFR SERPL CREATININE-BSD FRML MDRD: 62 ML/MIN/1.73M2
GLUCOSE BLD-MCNC: 69 MG/DL (ref 70–125)
HBA1C MFR BLD: 5.6 %
HCT VFR BLD AUTO: 39.1 % (ref 35–47)
HDLC SERPL-MCNC: 56 MG/DL
HGB BLD-MCNC: 12.6 G/DL (ref 11.7–15.7)
LDLC SERPL CALC-MCNC: 70 MG/DL
MCH RBC QN AUTO: 31.4 PG (ref 26.5–33)
MCHC RBC AUTO-ENTMCNC: 32.2 G/DL (ref 31.5–36.5)
MCV RBC AUTO: 98 FL (ref 78–100)
PHOSPHATE SERPL-MCNC: 3.6 MG/DL (ref 2.5–4.5)
PLATELET # BLD AUTO: 257 10E3/UL (ref 150–450)
POTASSIUM BLD-SCNC: 3.9 MMOL/L (ref 3.5–5)
RBC # BLD AUTO: 4.01 10E6/UL (ref 3.8–5.2)
SODIUM SERPL-SCNC: 141 MMOL/L (ref 136–145)
TRIGL SERPL-MCNC: 151 MG/DL
TSH SERPL DL<=0.005 MIU/L-ACNC: 1.56 UIU/ML (ref 0.3–5)
WBC # BLD AUTO: 7.4 10E3/UL (ref 4–11)

## 2021-09-17 PROCEDURE — 36415 COLL VENOUS BLD VENIPUNCTURE: CPT | Mod: ORL | Performed by: FAMILY MEDICINE

## 2021-09-17 PROCEDURE — 82306 VITAMIN D 25 HYDROXY: CPT | Mod: ORL | Performed by: FAMILY MEDICINE

## 2021-09-17 PROCEDURE — 80069 RENAL FUNCTION PANEL: CPT | Mod: ORL | Performed by: FAMILY MEDICINE

## 2021-09-17 PROCEDURE — 80061 LIPID PANEL: CPT | Mod: ORL | Performed by: FAMILY MEDICINE

## 2021-09-17 PROCEDURE — 83036 HEMOGLOBIN GLYCOSYLATED A1C: CPT | Mod: ORL | Performed by: FAMILY MEDICINE

## 2021-09-17 PROCEDURE — 85027 COMPLETE CBC AUTOMATED: CPT | Mod: ORL | Performed by: FAMILY MEDICINE

## 2021-09-17 PROCEDURE — 84443 ASSAY THYROID STIM HORMONE: CPT | Mod: ORL | Performed by: FAMILY MEDICINE

## 2021-09-17 PROCEDURE — P9603 ONE-WAY ALLOW PRORATED MILES: HCPCS | Mod: ORL | Performed by: FAMILY MEDICINE

## 2021-09-20 LAB — DEPRECATED CALCIDIOL+CALCIFEROL SERPL-MC: 30 UG/L (ref 30–80)

## 2022-03-04 ENCOUNTER — LAB REQUISITION (OUTPATIENT)
Dept: LAB | Facility: CLINIC | Age: 77
End: 2022-03-04
Payer: COMMERCIAL

## 2022-03-04 DIAGNOSIS — E78.5 HYPERLIPIDEMIA, UNSPECIFIED: ICD-10-CM

## 2022-03-04 DIAGNOSIS — I10 ESSENTIAL (PRIMARY) HYPERTENSION: ICD-10-CM

## 2022-03-04 DIAGNOSIS — G47.10 HYPERSOMNIA, UNSPECIFIED: ICD-10-CM

## 2022-03-04 DIAGNOSIS — E03.9 HYPOTHYROIDISM, UNSPECIFIED: ICD-10-CM

## 2022-03-04 DIAGNOSIS — F32.9 MAJOR DEPRESSIVE DISORDER, SINGLE EPISODE, UNSPECIFIED: ICD-10-CM

## 2022-03-07 LAB
ANION GAP SERPL CALCULATED.3IONS-SCNC: 14 MMOL/L (ref 5–18)
BUN SERPL-MCNC: 21 MG/DL (ref 8–28)
CALCIUM SERPL-MCNC: 10.6 MG/DL (ref 8.5–10.5)
CHLORIDE BLD-SCNC: 100 MMOL/L (ref 98–107)
CHOLEST SERPL-MCNC: 173 MG/DL
CO2 SERPL-SCNC: 26 MMOL/L (ref 22–31)
CREAT SERPL-MCNC: 1.03 MG/DL (ref 0.6–1.1)
ERYTHROCYTE [DISTWIDTH] IN BLOOD BY AUTOMATED COUNT: 13.3 % (ref 10–15)
FASTING STATUS PATIENT QL REPORTED: ABNORMAL
GFR SERPL CREATININE-BSD FRML MDRD: 56 ML/MIN/1.73M2
GLUCOSE BLD-MCNC: 105 MG/DL (ref 70–125)
HBA1C MFR BLD: 5.8 %
HCT VFR BLD AUTO: 46.4 % (ref 35–47)
HDLC SERPL-MCNC: 62 MG/DL
HGB BLD-MCNC: 14.8 G/DL (ref 11.7–15.7)
LDLC SERPL CALC-MCNC: 77 MG/DL
MCH RBC QN AUTO: 30.3 PG (ref 26.5–33)
MCHC RBC AUTO-ENTMCNC: 31.9 G/DL (ref 31.5–36.5)
MCV RBC AUTO: 95 FL (ref 78–100)
PLATELET # BLD AUTO: 282 10E3/UL (ref 150–450)
POTASSIUM BLD-SCNC: 3.8 MMOL/L (ref 3.5–5)
RBC # BLD AUTO: 4.88 10E6/UL (ref 3.8–5.2)
SODIUM SERPL-SCNC: 140 MMOL/L (ref 136–145)
TRIGL SERPL-MCNC: 170 MG/DL
TSH SERPL DL<=0.005 MIU/L-ACNC: 1.45 UIU/ML (ref 0.3–5)
WBC # BLD AUTO: 8.9 10E3/UL (ref 4–11)

## 2022-03-07 PROCEDURE — 83036 HEMOGLOBIN GLYCOSYLATED A1C: CPT | Mod: ORL | Performed by: FAMILY MEDICINE

## 2022-03-07 PROCEDURE — 82306 VITAMIN D 25 HYDROXY: CPT | Mod: ORL | Performed by: FAMILY MEDICINE

## 2022-03-07 PROCEDURE — 36415 COLL VENOUS BLD VENIPUNCTURE: CPT | Mod: ORL | Performed by: FAMILY MEDICINE

## 2022-03-07 PROCEDURE — 85027 COMPLETE CBC AUTOMATED: CPT | Mod: ORL | Performed by: FAMILY MEDICINE

## 2022-03-07 PROCEDURE — 84443 ASSAY THYROID STIM HORMONE: CPT | Mod: ORL | Performed by: FAMILY MEDICINE

## 2022-03-07 PROCEDURE — P9604 ONE-WAY ALLOW PRORATED TRIP: HCPCS | Mod: ORL | Performed by: FAMILY MEDICINE

## 2022-03-07 PROCEDURE — 80048 BASIC METABOLIC PNL TOTAL CA: CPT | Mod: ORL | Performed by: FAMILY MEDICINE

## 2022-03-07 PROCEDURE — 80061 LIPID PANEL: CPT | Mod: ORL | Performed by: FAMILY MEDICINE

## 2022-03-08 LAB — DEPRECATED CALCIDIOL+CALCIFEROL SERPL-MC: 32 UG/L (ref 30–80)

## 2022-03-31 NOTE — PROGRESS NOTES
Problem: Alteration in Thoughts and Perception  Goal: Treatment Goal: Gain control of psychotic behaviors/thinking, reduce/eliminate presenting symptoms and demonstrate improved reality functioning upon discharge  Outcome: Progressing  Goal: Refrain from acting on delusional thinking/internal stimuli  Description: Interventions:  - Monitor patient closely, per order   - Utilize least restrictive measures   - Set reasonable limits, give positive feedback for acceptable   - Administer medications as ordered and monitor of potential side effects  Outcome: Progressing  Goal: Agree to be compliant with medication regime, as prescribed and report medication side effects  Description: Interventions:  - Offer appropriate PRN medication and supervise ingestion; conduct AIMS, as needed   Outcome: Progressing  Goal: Attend and participate in unit activities, including therapeutic, recreational, and educational groups  Description: Interventions:  -Encourage Visitation and family involvement in care  Outcome: Progressing  Goal: Complete daily ADLs, including personal hygiene independently, as able  Description: Interventions:  - Observe, teach, and assist patient with ADLS  - Monitor and promote a balance of rest/activity, with adequate nutrition and elimination   Outcome: Progressing     Problem: Risk for Self Injury/Neglect  Goal: Treatment Goal: Remain safe during length of stay, learn and adopt new coping skills, and be free of self-injurious ideation, impulses and acts at the time of discharge  Outcome: Progressing  Goal: Refrain from harming self  Description: Interventions:  - Monitor patient closely, per order  - Develop a trusting relationship  - Supervise medication ingestion, monitor effects and side effects   Outcome: Progressing  Goal: Recognize maladaptive responses and adopt new coping mechanisms  Outcome: Progressing     Problem: Anxiety  Goal: Anxiety is at manageable level  Description: Interventions:  - [facility]    Facility:   [facility] [[id number]]   Code Status: POLST AVAILABLE    Reevaluation of 75-year-old female who continues in quarantine with COVID-19 PCR positivity, history of intellectual delay, hypertension, depression, deconditioning, recent increase in cough, cough now decreasing, continues supplemental O2 1 L.    Review of systems: No current cough by patient report.  Nursing report patient has minimal cough.  Denies dyspnea orthopnea or PND.  No fever sweats or chills.  No focal neurologic deficits of new onset.  Energy level at baseline.  Generalized fatigue present.  Remainder of 12 point review of systems obtained negative.    Exam: Sitting in chair, no evidence of respiratory distress, crowded oropharynx.  Blood pressure 164/70, heart rate 65, temperature 97.3, O2 90% on 1 L.  S1 and S2 regular.  Pulmonary exam with shallow inspiratory effort, no rales or wheezes.  Periphery with nonpitting edema 1 mm.    Impression and plan:   Continued hypoxia requiring supplemental O2, shallow inspiratory effort consistent with obesity hypoventilation syndrome, continue to encourage deep breathing, inhalers as necessary, remains afebrile.    Hypertension on Cozaar and Lozol, intermittent elevation of blood pressure recording, overall satisfactory control.    Intellectual delay chronic, no evidence of progression.    Significant deconditioning with ongoing need for rehabilitation.    Issues reviewed with patient and nursing staff.      Electronically signed by: Michell Senior MD     Assess and monitor patient's anxiety level  - Monitor for signs and symptoms (heart palpitations, chest pain, shortness of breath, headaches, nausea, feeling jumpy, restlessness, irritable, apprehensive)  - Collaborate with interdisciplinary team and initiate plan and interventions as ordered    - [place] patient to unit/surroundings  - Explain treatment plan  - Encourage participation in care  - Encourage verbalization of concerns/fears  - Identify coping mechanisms  - Assist in developing anxiety-reducing skills  - Administer/offer alternative therapies  - Limit or eliminate stimulants  Outcome: Progressing     Problem: Risk for Violence/Aggression Toward Others  Goal: Treatment Goal: Refrain from acts of violence/aggression during length of stay, and demonstrate improved impulse control at the time of discharge  Outcome: Progressing  Goal: Refrain from harming others  Outcome: Progressing  Goal: Refrain from destructive acts on the environment or property  Outcome: Progressing  Goal: Control angry outbursts  Description: Interventions:  - Monitor patient closely, per order  - Ensure early verbal de-escalation  - Monitor prn medication needs  - Set reasonable/therapeutic limits, outline behavioral expectations, and consequences   - Provide a non-threatening milieu, utilizing the least restrictive interventions   Outcome: Progressing     Problem: Alteration in Orientation  Goal: Interact with staff daily  Description: Interventions:  - Assess and re-assess patient's level of orientation  - Engage patient in 1 on 1 interactions, daily, for a minimum of 15 minutes   - Establish rapport/trust with patient   Outcome: Progressing  Goal: Allow medical examinations, as recommended  Description: Interventions:  - Provide physical/neurological exams and/or referrals, per provider   Outcome: Progressing  Goal: Cooperate with recommended testing/procedures  Description: Interventions:  - Determine need for ancillary testing  - Observe for mental status changes  - Implement falls/precaution protocol   Outcome: Progressing     Problem: Ineffective Coping  Goal: Participates in unit activities  Description: Interventions:  - Provide therapeutic environment   - Provide required programming   - Redirect inappropriate behaviors   Outcome: Progressing     Problem: DISCHARGE PLANNING - CARE MANAGEMENT  Goal: Discharge to post-acute care or home with appropriate resources  Description: INTERVENTIONS:  - Conduct assessment to determine patient/family and health care team treatment goals, and need for post-acute services based on payer coverage, community resources, and patient preferences, and barriers to discharge  - Address psychosocial, clinical, and financial barriers to discharge as identified in assessment in conjunction with the patient/family and health care team  - Arrange appropriate level of post-acute services according to patients   needs and preference and payer coverage in collaboration with the physician and health care team  - Communicate with and update the patient/family, physician, and health care team regarding progress on the discharge plan  - Arrange appropriate transportation to post-acute venues  Outcome: Progressing     Problem: Nutrition/Hydration-ADULT  Goal: Nutrient/Hydration intake appropriate for improving, restoring or maintaining nutritional needs  Description: Monitor and assess patient's nutrition/hydration status for malnutrition  Collaborate with interdisciplinary team and initiate plan and interventions as ordered  Monitor patient's weight and dietary intake as ordered or per policy  Utilize nutrition screening tool and intervene as necessary  Determine patient's food preferences and provide high-protein, high-caloric foods as appropriate       INTERVENTIONS:  - Monitor oral intake, urinary output, labs, and treatment plans  - Assess nutrition and hydration status and recommend course of action  - Evaluate amount of meals eaten  - Assist patient with eating if necessary   - Allow adequate time for meals  - Recommend/ encourage appropriate diets, oral nutritional supplements, and vitamin/mineral supplements  - Order, calculate, and assess calorie counts as needed  - Recommend, monitor, and adjust tube feedings and TPN/PPN based on assessed needs  - Assess need for intravenous fluids  - Provide specific nutrition/hydration education as appropriate  - Include patient/family/caregiver in decisions related to nutrition  Outcome: Progressing

## 2022-09-14 ENCOUNTER — LAB REQUISITION (OUTPATIENT)
Dept: LAB | Facility: CLINIC | Age: 77
End: 2022-09-14
Payer: MEDICARE

## 2022-09-14 DIAGNOSIS — E55.9 VITAMIN D DEFICIENCY, UNSPECIFIED: ICD-10-CM

## 2022-09-14 DIAGNOSIS — E11.9 TYPE 2 DIABETES MELLITUS WITHOUT COMPLICATIONS (H): ICD-10-CM

## 2022-09-14 DIAGNOSIS — E78.5 HYPERLIPIDEMIA, UNSPECIFIED: ICD-10-CM

## 2022-09-14 DIAGNOSIS — E07.9 DISORDER OF THYROID, UNSPECIFIED: ICD-10-CM

## 2022-09-14 DIAGNOSIS — E53.8 DEFICIENCY OF OTHER SPECIFIED B GROUP VITAMINS: ICD-10-CM

## 2022-09-15 LAB
CHOLEST SERPL-MCNC: 150 MG/DL
ERYTHROCYTE [DISTWIDTH] IN BLOOD BY AUTOMATED COUNT: 12.7 % (ref 10–15)
HBA1C MFR BLD: 5.9 %
HCT VFR BLD AUTO: 39.6 % (ref 35–47)
HDLC SERPL-MCNC: 51 MG/DL
HGB BLD-MCNC: 12.5 G/DL (ref 11.7–15.7)
LDLC SERPL CALC-MCNC: 74 MG/DL
MCH RBC QN AUTO: 31.3 PG (ref 26.5–33)
MCHC RBC AUTO-ENTMCNC: 31.6 G/DL (ref 31.5–36.5)
MCV RBC AUTO: 99 FL (ref 78–100)
NONHDLC SERPL-MCNC: 99 MG/DL
PLATELET # BLD AUTO: 241 10E3/UL (ref 150–450)
RBC # BLD AUTO: 3.99 10E6/UL (ref 3.8–5.2)
TRIGL SERPL-MCNC: 127 MG/DL
VIT B12 SERPL-MCNC: 439 PG/ML (ref 232–1245)
WBC # BLD AUTO: 9.7 10E3/UL (ref 4–11)

## 2022-09-15 PROCEDURE — 83036 HEMOGLOBIN GLYCOSYLATED A1C: CPT | Mod: ORL | Performed by: FAMILY MEDICINE

## 2022-09-15 PROCEDURE — 85027 COMPLETE CBC AUTOMATED: CPT | Mod: ORL | Performed by: FAMILY MEDICINE

## 2022-09-15 PROCEDURE — 82306 VITAMIN D 25 HYDROXY: CPT | Mod: ORL | Performed by: FAMILY MEDICINE

## 2022-09-15 PROCEDURE — P9604 ONE-WAY ALLOW PRORATED TRIP: HCPCS | Mod: ORL | Performed by: FAMILY MEDICINE

## 2022-09-15 PROCEDURE — 80061 LIPID PANEL: CPT | Mod: ORL | Performed by: FAMILY MEDICINE

## 2022-09-15 PROCEDURE — 82607 VITAMIN B-12: CPT | Mod: ORL | Performed by: FAMILY MEDICINE

## 2022-09-15 PROCEDURE — 84443 ASSAY THYROID STIM HORMONE: CPT | Mod: ORL | Performed by: FAMILY MEDICINE

## 2022-09-15 PROCEDURE — 36415 COLL VENOUS BLD VENIPUNCTURE: CPT | Mod: ORL | Performed by: FAMILY MEDICINE

## 2022-09-16 LAB
DEPRECATED CALCIDIOL+CALCIFEROL SERPL-MC: 36 UG/L (ref 20–75)
TSH SERPL DL<=0.005 MIU/L-ACNC: 1.35 UIU/ML (ref 0.3–4.2)

## 2022-10-12 ENCOUNTER — LAB REQUISITION (OUTPATIENT)
Dept: LAB | Facility: CLINIC | Age: 77
End: 2022-10-12
Payer: COMMERCIAL

## 2022-10-12 DIAGNOSIS — I10 ESSENTIAL (PRIMARY) HYPERTENSION: ICD-10-CM

## 2022-10-13 LAB
ALBUMIN SERPL BCG-MCNC: 3.5 G/DL (ref 3.5–5.2)
ALP SERPL-CCNC: 52 U/L (ref 35–104)
ALT SERPL W P-5'-P-CCNC: 9 U/L (ref 10–35)
ANION GAP SERPL CALCULATED.3IONS-SCNC: 8 MMOL/L (ref 7–15)
AST SERPL W P-5'-P-CCNC: 17 U/L (ref 10–35)
BILIRUB SERPL-MCNC: 0.4 MG/DL
BUN SERPL-MCNC: 21.2 MG/DL (ref 8–23)
CALCIUM SERPL-MCNC: 9.3 MG/DL (ref 8.8–10.2)
CHLORIDE SERPL-SCNC: 97 MMOL/L (ref 98–107)
CREAT SERPL-MCNC: 0.99 MG/DL (ref 0.51–0.95)
DEPRECATED HCO3 PLAS-SCNC: 32 MMOL/L (ref 22–29)
GFR SERPL CREATININE-BSD FRML MDRD: 59 ML/MIN/1.73M2
GLUCOSE SERPL-MCNC: 88 MG/DL (ref 70–99)
POTASSIUM SERPL-SCNC: 3.7 MMOL/L (ref 3.4–5.3)
PROT SERPL-MCNC: 5.9 G/DL (ref 6.4–8.3)
SODIUM SERPL-SCNC: 137 MMOL/L (ref 136–145)

## 2022-10-13 PROCEDURE — 36415 COLL VENOUS BLD VENIPUNCTURE: CPT | Mod: ORL | Performed by: FAMILY MEDICINE

## 2022-10-13 PROCEDURE — P9604 ONE-WAY ALLOW PRORATED TRIP: HCPCS | Mod: ORL | Performed by: FAMILY MEDICINE

## 2022-10-13 PROCEDURE — 80053 COMPREHEN METABOLIC PANEL: CPT | Mod: ORL | Performed by: FAMILY MEDICINE

## 2023-01-01 ENCOUNTER — LAB REQUISITION (OUTPATIENT)
Dept: LAB | Facility: CLINIC | Age: 78
End: 2023-01-01
Payer: COMMERCIAL

## 2023-01-01 DIAGNOSIS — R30.0 DYSURIA: ICD-10-CM

## 2023-01-01 DIAGNOSIS — I10 ESSENTIAL (PRIMARY) HYPERTENSION: ICD-10-CM

## 2023-01-01 DIAGNOSIS — E11.9 TYPE 2 DIABETES MELLITUS WITHOUT COMPLICATIONS (H): ICD-10-CM

## 2023-01-01 DIAGNOSIS — E55.9 VITAMIN D DEFICIENCY, UNSPECIFIED: ICD-10-CM

## 2023-01-01 DIAGNOSIS — E07.9 DISORDER OF THYROID, UNSPECIFIED: ICD-10-CM

## 2023-01-01 DIAGNOSIS — E53.8 DEFICIENCY OF OTHER SPECIFIED B GROUP VITAMINS: ICD-10-CM

## 2023-01-01 LAB
ALBUMIN SERPL BCG-MCNC: 3.9 G/DL (ref 3.5–5.2)
ALBUMIN UR-MCNC: NEGATIVE MG/DL
ALP SERPL-CCNC: 54 U/L (ref 35–104)
ALT SERPL W P-5'-P-CCNC: 11 U/L (ref 0–50)
ANION GAP SERPL CALCULATED.3IONS-SCNC: 9 MMOL/L (ref 7–15)
APPEARANCE UR: ABNORMAL
AST SERPL W P-5'-P-CCNC: 19 U/L (ref 0–45)
BACTERIA #/AREA URNS HPF: ABNORMAL /HPF
BACTERIA UR CULT: ABNORMAL
BILIRUB SERPL-MCNC: 0.7 MG/DL
BILIRUB UR QL STRIP: NEGATIVE
BUN SERPL-MCNC: 25.2 MG/DL (ref 8–23)
CALCIUM SERPL-MCNC: 10.1 MG/DL (ref 8.8–10.2)
CHLORIDE SERPL-SCNC: 99 MMOL/L (ref 98–107)
COLOR UR AUTO: ABNORMAL
CREAT SERPL-MCNC: 0.97 MG/DL (ref 0.51–0.95)
DEPRECATED CALCIDIOL+CALCIFEROL SERPL-MC: 40 UG/L (ref 20–75)
DEPRECATED HCO3 PLAS-SCNC: 32 MMOL/L (ref 22–29)
EGFRCR SERPLBLD CKD-EPI 2021: 60 ML/MIN/1.73M2
ERYTHROCYTE [DISTWIDTH] IN BLOOD BY AUTOMATED COUNT: 13.1 % (ref 10–15)
FOLATE SERPL-MCNC: 6 NG/ML (ref 4.6–34.8)
GLUCOSE SERPL-MCNC: 81 MG/DL (ref 70–99)
GLUCOSE UR STRIP-MCNC: NEGATIVE MG/DL
HBA1C MFR BLD: 6 %
HCT VFR BLD AUTO: 41.3 % (ref 35–47)
HGB BLD-MCNC: 13.1 G/DL (ref 11.7–15.7)
HGB UR QL STRIP: NEGATIVE
KETONES UR STRIP-MCNC: NEGATIVE MG/DL
LEUKOCYTE ESTERASE UR QL STRIP: ABNORMAL
MCH RBC QN AUTO: 31.2 PG (ref 26.5–33)
MCHC RBC AUTO-ENTMCNC: 31.7 G/DL (ref 31.5–36.5)
MCV RBC AUTO: 98 FL (ref 78–100)
MUCOUS THREADS #/AREA URNS LPF: PRESENT /LPF
NITRATE UR QL: POSITIVE
PH UR STRIP: 6 [PH] (ref 5–7)
PLATELET # BLD AUTO: 228 10E3/UL (ref 150–450)
POTASSIUM SERPL-SCNC: 3.6 MMOL/L (ref 3.4–5.3)
PROT SERPL-MCNC: 6.7 G/DL (ref 6.4–8.3)
RBC # BLD AUTO: 4.2 10E6/UL (ref 3.8–5.2)
RBC URINE: 1 /HPF
SODIUM SERPL-SCNC: 140 MMOL/L (ref 136–145)
SP GR UR STRIP: 1.01 (ref 1–1.03)
SQUAMOUS EPITHELIAL: 1 /HPF
TSH SERPL DL<=0.005 MIU/L-ACNC: 0.95 UIU/ML (ref 0.3–4.2)
UROBILINOGEN UR STRIP-MCNC: NORMAL MG/DL
VIT B12 SERPL-MCNC: 441 PG/ML (ref 232–1245)
WBC # BLD AUTO: 6.2 10E3/UL (ref 4–11)
WBC CLUMPS #/AREA URNS HPF: PRESENT /HPF
WBC URINE: 87 /HPF

## 2023-01-01 PROCEDURE — 80053 COMPREHEN METABOLIC PANEL: CPT | Mod: ORL | Performed by: FAMILY MEDICINE

## 2023-01-01 PROCEDURE — 82746 ASSAY OF FOLIC ACID SERUM: CPT | Mod: ORL | Performed by: FAMILY MEDICINE

## 2023-01-01 PROCEDURE — 81001 URINALYSIS AUTO W/SCOPE: CPT | Mod: ORL | Performed by: FAMILY MEDICINE

## 2023-01-01 PROCEDURE — 82306 VITAMIN D 25 HYDROXY: CPT | Mod: ORL | Performed by: FAMILY MEDICINE

## 2023-01-01 PROCEDURE — 84443 ASSAY THYROID STIM HORMONE: CPT | Mod: ORL | Performed by: FAMILY MEDICINE

## 2023-01-01 PROCEDURE — P9604 ONE-WAY ALLOW PRORATED TRIP: HCPCS | Mod: ORL | Performed by: FAMILY MEDICINE

## 2023-01-01 PROCEDURE — 87186 SC STD MICRODIL/AGAR DIL: CPT | Mod: ORL | Performed by: FAMILY MEDICINE

## 2023-01-01 PROCEDURE — 85027 COMPLETE CBC AUTOMATED: CPT | Mod: ORL | Performed by: FAMILY MEDICINE

## 2023-01-01 PROCEDURE — 36415 COLL VENOUS BLD VENIPUNCTURE: CPT | Mod: ORL | Performed by: FAMILY MEDICINE

## 2023-01-01 PROCEDURE — 83036 HEMOGLOBIN GLYCOSYLATED A1C: CPT | Mod: ORL | Performed by: FAMILY MEDICINE

## 2023-01-01 PROCEDURE — 82607 VITAMIN B-12: CPT | Mod: ORL | Performed by: FAMILY MEDICINE

## 2023-03-22 ENCOUNTER — LAB REQUISITION (OUTPATIENT)
Dept: LAB | Facility: CLINIC | Age: 78
End: 2023-03-22
Payer: COMMERCIAL

## 2023-03-22 DIAGNOSIS — E53.8 DEFICIENCY OF OTHER SPECIFIED B GROUP VITAMINS: ICD-10-CM

## 2023-03-22 DIAGNOSIS — I10 ESSENTIAL (PRIMARY) HYPERTENSION: ICD-10-CM

## 2023-03-22 DIAGNOSIS — E55.9 VITAMIN D DEFICIENCY, UNSPECIFIED: ICD-10-CM

## 2023-03-22 DIAGNOSIS — E11.9 TYPE 2 DIABETES MELLITUS WITHOUT COMPLICATIONS (H): ICD-10-CM

## 2023-03-22 DIAGNOSIS — E78.5 HYPERLIPIDEMIA, UNSPECIFIED: ICD-10-CM

## 2023-03-23 LAB
ANION GAP SERPL CALCULATED.3IONS-SCNC: 13 MMOL/L (ref 7–15)
BASOPHILS # BLD AUTO: 0.1 10E3/UL (ref 0–0.2)
BASOPHILS NFR BLD AUTO: 1 %
BUN SERPL-MCNC: 15.5 MG/DL (ref 8–23)
CALCIUM SERPL-MCNC: 9.9 MG/DL (ref 8.8–10.2)
CHLORIDE SERPL-SCNC: 99 MMOL/L (ref 98–107)
CHOLEST SERPL-MCNC: 157 MG/DL
CREAT SERPL-MCNC: 0.96 MG/DL (ref 0.51–0.95)
DEPRECATED CALCIDIOL+CALCIFEROL SERPL-MC: 40 UG/L (ref 20–75)
DEPRECATED HCO3 PLAS-SCNC: 28 MMOL/L (ref 22–29)
EOSINOPHIL # BLD AUTO: 0.3 10E3/UL (ref 0–0.7)
EOSINOPHIL NFR BLD AUTO: 3 %
ERYTHROCYTE [DISTWIDTH] IN BLOOD BY AUTOMATED COUNT: 13.1 % (ref 10–15)
FOLATE SERPL-MCNC: 13 NG/ML (ref 4.6–34.8)
GFR SERPL CREATININE-BSD FRML MDRD: 61 ML/MIN/1.73M2
GLUCOSE SERPL-MCNC: 117 MG/DL (ref 70–99)
HBA1C MFR BLD: 6 %
HCT VFR BLD AUTO: 44.1 % (ref 35–47)
HDLC SERPL-MCNC: 53 MG/DL
HGB BLD-MCNC: 13.9 G/DL (ref 11.7–15.7)
IMM GRANULOCYTES # BLD: 0 10E3/UL
IMM GRANULOCYTES NFR BLD: 0 %
LDLC SERPL CALC-MCNC: 80 MG/DL
LYMPHOCYTES # BLD AUTO: 2.4 10E3/UL (ref 0.8–5.3)
LYMPHOCYTES NFR BLD AUTO: 27 %
MCH RBC QN AUTO: 30.4 PG (ref 26.5–33)
MCHC RBC AUTO-ENTMCNC: 31.5 G/DL (ref 31.5–36.5)
MCV RBC AUTO: 97 FL (ref 78–100)
MONOCYTES # BLD AUTO: 1.2 10E3/UL (ref 0–1.3)
MONOCYTES NFR BLD AUTO: 13 %
NEUTROPHILS # BLD AUTO: 5.2 10E3/UL (ref 1.6–8.3)
NEUTROPHILS NFR BLD AUTO: 56 %
NONHDLC SERPL-MCNC: 104 MG/DL
NRBC # BLD AUTO: 0 10E3/UL
NRBC BLD AUTO-RTO: 0 /100
PLATELET # BLD AUTO: 235 10E3/UL (ref 150–450)
POTASSIUM SERPL-SCNC: 3.5 MMOL/L (ref 3.4–5.3)
RBC # BLD AUTO: 4.57 10E6/UL (ref 3.8–5.2)
SODIUM SERPL-SCNC: 140 MMOL/L (ref 136–145)
TRIGL SERPL-MCNC: 121 MG/DL
TSH SERPL DL<=0.005 MIU/L-ACNC: 0.77 UIU/ML (ref 0.3–4.2)
VIT B12 SERPL-MCNC: 746 PG/ML (ref 232–1245)
WBC # BLD AUTO: 9.2 10E3/UL (ref 4–11)

## 2023-03-23 PROCEDURE — 36415 COLL VENOUS BLD VENIPUNCTURE: CPT | Mod: ORL | Performed by: FAMILY MEDICINE

## 2023-03-23 PROCEDURE — P9604 ONE-WAY ALLOW PRORATED TRIP: HCPCS | Mod: ORL | Performed by: FAMILY MEDICINE

## 2023-03-23 PROCEDURE — 84443 ASSAY THYROID STIM HORMONE: CPT | Mod: ORL | Performed by: FAMILY MEDICINE

## 2023-03-23 PROCEDURE — 82306 VITAMIN D 25 HYDROXY: CPT | Mod: ORL | Performed by: FAMILY MEDICINE

## 2023-03-23 PROCEDURE — 82746 ASSAY OF FOLIC ACID SERUM: CPT | Mod: ORL | Performed by: FAMILY MEDICINE

## 2023-03-23 PROCEDURE — 82607 VITAMIN B-12: CPT | Mod: ORL | Performed by: FAMILY MEDICINE

## 2023-03-23 PROCEDURE — 80048 BASIC METABOLIC PNL TOTAL CA: CPT | Mod: ORL | Performed by: FAMILY MEDICINE

## 2023-03-23 PROCEDURE — 85025 COMPLETE CBC W/AUTO DIFF WBC: CPT | Mod: ORL | Performed by: FAMILY MEDICINE

## 2023-03-23 PROCEDURE — 80061 LIPID PANEL: CPT | Mod: ORL | Performed by: FAMILY MEDICINE

## 2023-03-23 PROCEDURE — 83036 HEMOGLOBIN GLYCOSYLATED A1C: CPT | Mod: ORL | Performed by: FAMILY MEDICINE
